# Patient Record
Sex: MALE | Race: WHITE | NOT HISPANIC OR LATINO | Employment: UNEMPLOYED | URBAN - NONMETROPOLITAN AREA
[De-identification: names, ages, dates, MRNs, and addresses within clinical notes are randomized per-mention and may not be internally consistent; named-entity substitution may affect disease eponyms.]

---

## 2017-07-08 ENCOUNTER — HOSPITAL ENCOUNTER (EMERGENCY)
Facility: HOSPITAL | Age: 27
Discharge: HOME | End: 2017-07-08
Attending: EMERGENCY MEDICINE | Admitting: EMERGENCY MEDICINE

## 2017-07-08 VITALS
HEART RATE: 76 BPM | OXYGEN SATURATION: 96 % | RESPIRATION RATE: 18 BRPM | DIASTOLIC BLOOD PRESSURE: 79 MMHG | WEIGHT: 210 LBS | SYSTOLIC BLOOD PRESSURE: 124 MMHG | BODY MASS INDEX: 33.75 KG/M2 | HEIGHT: 66 IN | TEMPERATURE: 98.2 F

## 2017-07-08 DIAGNOSIS — T63.001A SNAKE BITE, ACCIDENTAL OR UNINTENTIONAL, INITIAL ENCOUNTER: Primary | ICD-10-CM

## 2017-07-08 PROCEDURE — 25010000002 TDAP 5-2.5-18.5 LF-MCG/0.5 SUSPENSION: Performed by: NURSE PRACTITIONER

## 2017-07-08 PROCEDURE — 90471 IMMUNIZATION ADMIN: CPT | Performed by: NURSE PRACTITIONER

## 2017-07-08 PROCEDURE — 90715 TDAP VACCINE 7 YRS/> IM: CPT | Performed by: NURSE PRACTITIONER

## 2017-07-08 PROCEDURE — 99283 EMERGENCY DEPT VISIT LOW MDM: CPT

## 2017-07-08 RX ORDER — IBUPROFEN 400 MG/1
400 TABLET ORAL ONCE
Status: COMPLETED | OUTPATIENT
Start: 2017-07-08 | End: 2017-07-08

## 2017-07-08 RX ORDER — ACETAMINOPHEN 325 MG/1
1000 TABLET ORAL ONCE
Status: COMPLETED | OUTPATIENT
Start: 2017-07-08 | End: 2017-07-08

## 2017-07-08 RX ADMIN — IBUPROFEN 400 MG: 400 TABLET ORAL at 22:11

## 2017-07-08 RX ADMIN — ACETAMINOPHEN 975 MG: 325 TABLET ORAL at 22:09

## 2017-07-08 RX ADMIN — TETANUS TOXOID, REDUCED DIPHTHERIA TOXOID AND ACELLULAR PERTUSSIS VACCINE, ADSORBED 0.5 ML: 5; 2.5; 8; 8; 2.5 SUSPENSION INTRAMUSCULAR at 22:12

## 2017-07-09 NOTE — ED NOTES
[place] by what he thinks was a copperhead snake on the inner aspect of big toe on right foot. Two parallel puncture type wounds noted at this time with redness. No swelling, drainage or warmth to the site noted at this time. Pt denies any numbness or tingling at site. Pt able to move toes freely.      Priyanka Woodson RN  07/08/17 1929

## 2017-07-09 NOTE — ED PROVIDER NOTES
Subjective   Patient is a 27 y.o. male presenting with animal bite.   History provided by:  Patient   used: No    Animal Bite   Contact animal:  Snake  Location:  Toe  Toe injury location:  L great toe  Time since incident:  2 hours  Pain details:     Quality:  Sore    Severity:  Mild    Progression:  Unchanged  Incident location:  Outside  Provoked: unprovoked    Notifications:  None  Animal in possession: no    Tetanus status:  Out of date  Relieved by:  Nothing  Worsened by:  Nothing  Ineffective treatments:  None tried  Associated symptoms: no fever, no rash and no swelling        Review of Systems   Constitutional: Negative.  Negative for fever.   HENT: Negative.    Eyes: Negative.    Respiratory: Negative.    Cardiovascular: Negative.    Gastrointestinal: Negative.    Endocrine: Negative.    Genitourinary: Negative.    Musculoskeletal: Negative.    Skin: Positive for wound. Negative for rash.   Allergic/Immunologic: Negative.    Neurological: Negative.    Hematological: Negative.    Psychiatric/Behavioral: Negative.    All other systems reviewed and are negative.      History reviewed. No pertinent past medical history.    Allergies   Allergen Reactions   • Aspirin        History reviewed. No pertinent surgical history.    History reviewed. No pertinent family history.    Social History     Social History   • Marital status: Single     Spouse name: N/A   • Number of children: N/A   • Years of education: N/A     Social History Main Topics   • Smoking status: Current Every Day Smoker     Packs/day: 0.50   • Smokeless tobacco: None   • Alcohol use No   • Drug use: Defer   • Sexual activity: Defer     Other Topics Concern   • None     Social History Narrative           Objective   Physical Exam   Constitutional: He is oriented to person, place, and time. He appears well-developed and well-nourished.   HENT:   Head: Normocephalic and atraumatic.   Nose: Nose normal.   Mouth/Throat: Oropharynx is  clear and moist.   Eyes: EOM are normal. Pupils are equal, round, and reactive to light.   Neck: Normal range of motion. Neck supple.   Cardiovascular: Normal rate, regular rhythm, normal heart sounds and intact distal pulses.    Pulmonary/Chest: Effort normal and breath sounds normal.   Abdominal: Soft. Bowel sounds are normal.   Musculoskeletal: Normal range of motion.   Neurological: He is alert and oriented to person, place, and time.   Skin:   Tiny pinprick abrasions to left inner toe <0.5 cm apart. No surrounding erythema or ecchymosis.    Psychiatric: He has a normal mood and affect. His behavior is normal. Judgment and thought content normal.   Nursing note and vitals reviewed.      Procedures         ED Course  ED Course   Comment By Time   I reevaluated patient, denies any symptoms. No edema or ecchymosis of left great toe SHERWIN Washington 07/08 8398                  MDM  Number of Diagnoses or Management Options  Snake bite, accidental or unintentional, initial encounter: new and does not require workup  Risk of Complications, Morbidity, and/or Mortality  Presenting problems: moderate  Diagnostic procedures: moderate  Management options: moderate    Patient Progress  Patient progress: improved      Final diagnoses:   Snake bite, accidental or unintentional, initial encounter            SHERWIN Washington  07/08/17 4820

## 2017-07-09 NOTE — ED NOTES
No adverse reaction noted at injection site at this time       Priyanka Woodson RN  07/08/17 2036

## 2018-04-11 ENCOUNTER — HOSPITAL ENCOUNTER (EMERGENCY)
Facility: HOSPITAL | Age: 28
Discharge: HOME | End: 2018-04-11
Attending: EMERGENCY MEDICINE | Admitting: EMERGENCY MEDICINE

## 2018-04-11 ENCOUNTER — APPOINTMENT (OUTPATIENT)
Dept: GENERAL RADIOLOGY | Facility: HOSPITAL | Age: 28
End: 2018-04-11

## 2018-04-11 VITALS
SYSTOLIC BLOOD PRESSURE: 102 MMHG | WEIGHT: 215 LBS | RESPIRATION RATE: 18 BRPM | TEMPERATURE: 98.2 F | HEIGHT: 66 IN | OXYGEN SATURATION: 98 % | HEART RATE: 57 BPM | BODY MASS INDEX: 34.55 KG/M2 | DIASTOLIC BLOOD PRESSURE: 67 MMHG

## 2018-04-11 DIAGNOSIS — R07.9 CHEST PAIN, UNSPECIFIED TYPE: Primary | ICD-10-CM

## 2018-04-11 LAB
6-ACETYL MORPHINE: NEGATIVE
ALBUMIN SERPL-MCNC: 4.3 G/DL (ref 3.5–5)
ALBUMIN/GLOB SERPL: 1.5 G/DL (ref 1.5–2.5)
ALP SERPL-CCNC: 75 U/L (ref 40–129)
ALT SERPL W P-5'-P-CCNC: 38 U/L (ref 10–44)
AMPHET+METHAMPHET UR QL: NEGATIVE
ANION GAP SERPL CALCULATED.3IONS-SCNC: 5.5 MMOL/L (ref 3.6–11.2)
AST SERPL-CCNC: 23 U/L (ref 10–34)
BACTERIA UR QL AUTO: NORMAL /HPF
BARBITURATES UR QL SCN: NEGATIVE
BASOPHILS # BLD AUTO: 0.02 10*3/MM3 (ref 0–0.3)
BASOPHILS NFR BLD AUTO: 0.3 % (ref 0–2)
BENZODIAZ UR QL SCN: NEGATIVE
BILIRUB SERPL-MCNC: 0.6 MG/DL (ref 0.2–1.8)
BILIRUB UR QL STRIP: NEGATIVE
BUN BLD-MCNC: 12 MG/DL (ref 7–21)
BUN/CREAT SERPL: 15 (ref 7–25)
BUPRENORPHINE SERPL-MCNC: NEGATIVE NG/ML
CALCIUM SPEC-SCNC: 9.4 MG/DL (ref 7.7–10)
CANNABINOIDS SERPL QL: NEGATIVE
CHLORIDE SERPL-SCNC: 107 MMOL/L (ref 99–112)
CLARITY UR: CLEAR
CO2 SERPL-SCNC: 27.5 MMOL/L (ref 24.3–31.9)
COCAINE UR QL: NEGATIVE
COLOR UR: YELLOW
CREAT BLD-MCNC: 0.8 MG/DL (ref 0.43–1.29)
D DIMER PPP FEU-MCNC: <0.27 MCGFEU/ML (ref 0–0.5)
DEPRECATED RDW RBC AUTO: 38.1 FL (ref 37–54)
EOSINOPHIL # BLD AUTO: 0.1 10*3/MM3 (ref 0–0.7)
EOSINOPHIL NFR BLD AUTO: 1.3 % (ref 0–5)
ERYTHROCYTE [DISTWIDTH] IN BLOOD BY AUTOMATED COUNT: 12.6 % (ref 11.5–14.5)
GFR SERPL CREATININE-BSD FRML MDRD: 114 ML/MIN/1.73
GLOBULIN UR ELPH-MCNC: 2.8 GM/DL
GLUCOSE BLD-MCNC: 88 MG/DL (ref 70–110)
GLUCOSE UR STRIP-MCNC: NEGATIVE MG/DL
HCT VFR BLD AUTO: 44.9 % (ref 42–52)
HGB BLD-MCNC: 15 G/DL (ref 14–18)
HGB UR QL STRIP.AUTO: NEGATIVE
HYALINE CASTS UR QL AUTO: NORMAL /LPF
IMM GRANULOCYTES # BLD: 0 10*3/MM3 (ref 0–0.03)
IMM GRANULOCYTES NFR BLD: 0 % (ref 0–0.5)
KETONES UR QL STRIP: ABNORMAL
LEUKOCYTE ESTERASE UR QL STRIP.AUTO: ABNORMAL
LYMPHOCYTES # BLD AUTO: 1.88 10*3/MM3 (ref 1–3)
LYMPHOCYTES NFR BLD AUTO: 25.3 % (ref 21–51)
MCH RBC QN AUTO: 27.8 PG (ref 27–33)
MCHC RBC AUTO-ENTMCNC: 33.4 G/DL (ref 33–37)
MCV RBC AUTO: 83.3 FL (ref 80–94)
METHADONE UR QL SCN: NEGATIVE
MONOCYTES # BLD AUTO: 0.57 10*3/MM3 (ref 0.1–0.9)
MONOCYTES NFR BLD AUTO: 7.7 % (ref 0–10)
NEUTROPHILS # BLD AUTO: 4.87 10*3/MM3 (ref 1.4–6.5)
NEUTROPHILS NFR BLD AUTO: 65.4 % (ref 30–70)
NITRITE UR QL STRIP: NEGATIVE
OPIATES UR QL: NEGATIVE
OSMOLALITY SERPL CALC.SUM OF ELEC: 278.6 MOSM/KG (ref 273–305)
OXYCODONE UR QL SCN: NEGATIVE
PCP UR QL SCN: NEGATIVE
PH UR STRIP.AUTO: 7.5 [PH] (ref 5–8)
PLATELET # BLD AUTO: 197 10*3/MM3 (ref 130–400)
PMV BLD AUTO: 11.1 FL (ref 6–10)
POTASSIUM BLD-SCNC: 3.9 MMOL/L (ref 3.5–5.3)
PROT SERPL-MCNC: 7.1 G/DL (ref 6–8)
PROT UR QL STRIP: NEGATIVE
RBC # BLD AUTO: 5.39 10*6/MM3 (ref 4.7–6.1)
RBC # UR: NORMAL /HPF
REF LAB TEST METHOD: NORMAL
SODIUM BLD-SCNC: 140 MMOL/L (ref 135–153)
SP GR UR STRIP: 1.02 (ref 1–1.03)
SQUAMOUS #/AREA URNS HPF: NORMAL /HPF
TROPONIN I SERPL-MCNC: <0.006 NG/ML
TROPONIN I SERPL-MCNC: <0.006 NG/ML
UROBILINOGEN UR QL STRIP: ABNORMAL
WBC NRBC COR # BLD: 7.44 10*3/MM3 (ref 4.5–12.5)
WBC UR QL AUTO: NORMAL /HPF

## 2018-04-11 PROCEDURE — 80307 DRUG TEST PRSMV CHEM ANLYZR: CPT | Performed by: PHYSICIAN ASSISTANT

## 2018-04-11 PROCEDURE — 93005 ELECTROCARDIOGRAM TRACING: CPT | Performed by: EMERGENCY MEDICINE

## 2018-04-11 PROCEDURE — 71046 X-RAY EXAM CHEST 2 VIEWS: CPT | Performed by: RADIOLOGY

## 2018-04-11 PROCEDURE — 81001 URINALYSIS AUTO W/SCOPE: CPT | Performed by: PHYSICIAN ASSISTANT

## 2018-04-11 PROCEDURE — 80053 COMPREHEN METABOLIC PANEL: CPT | Performed by: PHYSICIAN ASSISTANT

## 2018-04-11 PROCEDURE — 85025 COMPLETE CBC W/AUTO DIFF WBC: CPT | Performed by: PHYSICIAN ASSISTANT

## 2018-04-11 PROCEDURE — 96374 THER/PROPH/DIAG INJ IV PUSH: CPT

## 2018-04-11 PROCEDURE — 85379 FIBRIN DEGRADATION QUANT: CPT | Performed by: PHYSICIAN ASSISTANT

## 2018-04-11 PROCEDURE — 71046 X-RAY EXAM CHEST 2 VIEWS: CPT

## 2018-04-11 PROCEDURE — 96375 TX/PRO/DX INJ NEW DRUG ADDON: CPT

## 2018-04-11 PROCEDURE — 25010000002 ONDANSETRON PER 1 MG: Performed by: PHYSICIAN ASSISTANT

## 2018-04-11 PROCEDURE — 25010000002 MORPHINE PER 10 MG: Performed by: EMERGENCY MEDICINE

## 2018-04-11 PROCEDURE — 99284 EMERGENCY DEPT VISIT MOD MDM: CPT

## 2018-04-11 PROCEDURE — 93010 ELECTROCARDIOGRAM REPORT: CPT | Performed by: INTERNAL MEDICINE

## 2018-04-11 PROCEDURE — 84484 ASSAY OF TROPONIN QUANT: CPT | Performed by: PHYSICIAN ASSISTANT

## 2018-04-11 RX ORDER — PREDNISONE 20 MG/1
20 TABLET ORAL DAILY
Qty: 5 TABLET | Refills: 0 | Status: ON HOLD | OUTPATIENT
Start: 2018-04-11 | End: 2020-07-21

## 2018-04-11 RX ORDER — ONDANSETRON 2 MG/ML
4 INJECTION INTRAMUSCULAR; INTRAVENOUS ONCE
Status: COMPLETED | OUTPATIENT
Start: 2018-04-11 | End: 2018-04-11

## 2018-04-11 RX ORDER — MORPHINE SULFATE 2 MG/ML
2 INJECTION, SOLUTION INTRAMUSCULAR; INTRAVENOUS ONCE
Status: COMPLETED | OUTPATIENT
Start: 2018-04-11 | End: 2018-04-11

## 2018-04-11 RX ORDER — SODIUM CHLORIDE 0.9 % (FLUSH) 0.9 %
10 SYRINGE (ML) INJECTION AS NEEDED
Status: DISCONTINUED | OUTPATIENT
Start: 2018-04-11 | End: 2018-04-11 | Stop reason: HOSPADM

## 2018-04-11 RX ADMIN — ONDANSETRON 4 MG: 2 INJECTION INTRAMUSCULAR; INTRAVENOUS at 12:55

## 2018-04-11 RX ADMIN — MORPHINE SULFATE 2 MG: 2 INJECTION, SOLUTION INTRAMUSCULAR; INTRAVENOUS at 12:54

## 2018-04-11 RX ADMIN — NITROGLYCERIN 0.5 INCH: 20 OINTMENT TOPICAL at 12:51

## 2018-04-11 NOTE — DISCHARGE INSTRUCTIONS
Call one of the offices below to establish a primary care provider.  If you are unable to get an appointment and feel it is an emergency and need to be seen immediately please return to the Emergency Department.    Call one of the office below to set up a primary care provider.    Dr. Fidel Maurer                                                                                                       [address] [facility] [address]  [phone]    Dr. Linton, Dr. SONY Snow, Dr. ETIENNE Snow ([facility])  [address] [facility] [address]  [phone]    Dr. Flores, Dr. Vail, Dr. Long ([facility])  [address] [facility] [address]  [phone]    Dr. Crews  [address] [facility] [address]  [phone]    Dr. Streeter, Dr. Proctor, Dr. Chicas, Dr. Ramon ([facility])  [address] [place] [address] [address]  [facility] [address]  [phone]    Dr. Qian Freedman  [address] [facility] [place] [address]  [phone]    Dr. Cary Lowe  [address]   [address]  [facility] [address]  [phone]    Dr. Maurer  [address] [facility] [address]  [phone]    Dr. Nava, Dr. Iyer  [address] [facility] [place] [address]  [phone]    Dr. Hunt  [address]                                                              [address]  [facility] [address]  [phone]    Dr. Baker  [address] [facility] [address] [phone]    Dr. Greer Null  [address]  [address]  [facility] [address]  [phone]

## 2019-05-20 ENCOUNTER — HOSPITAL ENCOUNTER (OUTPATIENT)
Dept: PHYSICAL THERAPY | Facility: HOSPITAL | Age: 29
Setting detail: THERAPIES SERIES
Discharge: HOME | End: 2019-05-20

## 2019-05-20 PROCEDURE — 97750 PHYSICAL PERFORMANCE TEST: CPT

## 2019-07-03 ENCOUNTER — APPOINTMENT (OUTPATIENT)
Dept: GENERAL RADIOLOGY | Facility: HOSPITAL | Age: 29
End: 2019-07-03

## 2019-07-03 ENCOUNTER — HOSPITAL ENCOUNTER (EMERGENCY)
Facility: HOSPITAL | Age: 29
Discharge: HOME | End: 2019-07-03
Attending: FAMILY MEDICINE | Admitting: FAMILY MEDICINE

## 2019-07-03 VITALS
BODY MASS INDEX: 36.96 KG/M2 | OXYGEN SATURATION: 95 % | SYSTOLIC BLOOD PRESSURE: 107 MMHG | WEIGHT: 230 LBS | HEART RATE: 77 BPM | RESPIRATION RATE: 18 BRPM | DIASTOLIC BLOOD PRESSURE: 69 MMHG | HEIGHT: 66 IN | TEMPERATURE: 98.5 F

## 2019-07-03 DIAGNOSIS — R07.9 CHEST PAIN WITH LOW RISK FOR CARDIAC ETIOLOGY: Primary | ICD-10-CM

## 2019-07-03 LAB
6-ACETYL MORPHINE: NEGATIVE
ALBUMIN SERPL-MCNC: 4.13 G/DL (ref 3.5–5.2)
ALBUMIN/GLOB SERPL: 1.3 G/DL
ALP SERPL-CCNC: 79 U/L (ref 39–117)
ALT SERPL W P-5'-P-CCNC: 55 U/L (ref 1–41)
AMPHET+METHAMPHET UR QL: NEGATIVE
ANION GAP SERPL CALCULATED.3IONS-SCNC: 9.4 MMOL/L (ref 5–15)
APTT PPP: 28.2 SECONDS (ref 23.8–36.1)
AST SERPL-CCNC: 29 U/L (ref 1–40)
BARBITURATES UR QL SCN: NEGATIVE
BASOPHILS # BLD AUTO: 0.01 10*3/MM3 (ref 0–0.2)
BASOPHILS NFR BLD AUTO: 0.2 % (ref 0–1.5)
BENZODIAZ UR QL SCN: NEGATIVE
BILIRUB SERPL-MCNC: 0.3 MG/DL (ref 0.2–1.2)
BILIRUB UR QL STRIP: NEGATIVE
BUN BLD-MCNC: 11 MG/DL (ref 6–20)
BUN/CREAT SERPL: 14.1 (ref 7–25)
BUPRENORPHINE SERPL-MCNC: NEGATIVE NG/ML
CALCIUM SPEC-SCNC: 9.2 MG/DL (ref 8.6–10.5)
CANNABINOIDS SERPL QL: NEGATIVE
CHLORIDE SERPL-SCNC: 106 MMOL/L (ref 98–107)
CLARITY UR: CLEAR
CO2 SERPL-SCNC: 22.6 MMOL/L (ref 22–29)
COCAINE UR QL: NEGATIVE
COLOR UR: YELLOW
CREAT BLD-MCNC: 0.78 MG/DL (ref 0.76–1.27)
DEPRECATED RDW RBC AUTO: 38.5 FL (ref 37–54)
EOSINOPHIL # BLD AUTO: 0.15 10*3/MM3 (ref 0–0.4)
EOSINOPHIL NFR BLD AUTO: 2.3 % (ref 0.3–6.2)
ERYTHROCYTE [DISTWIDTH] IN BLOOD BY AUTOMATED COUNT: 12.8 % (ref 12.3–15.4)
GFR SERPL CREATININE-BSD FRML MDRD: 117 ML/MIN/1.73
GLOBULIN UR ELPH-MCNC: 3.1 GM/DL
GLUCOSE BLD-MCNC: 110 MG/DL (ref 65–99)
GLUCOSE UR STRIP-MCNC: NEGATIVE MG/DL
HCT VFR BLD AUTO: 43.9 % (ref 37.5–51)
HGB BLD-MCNC: 14.9 G/DL (ref 13–17.7)
HGB UR QL STRIP.AUTO: NEGATIVE
IMM GRANULOCYTES # BLD AUTO: 0.03 10*3/MM3 (ref 0–0.05)
IMM GRANULOCYTES NFR BLD AUTO: 0.5 % (ref 0–0.5)
INR PPP: 0.9 (ref 0.9–1.1)
KETONES UR QL STRIP: NEGATIVE
LEUKOCYTE ESTERASE UR QL STRIP.AUTO: NEGATIVE
LIPASE SERPL-CCNC: 21 U/L (ref 13–60)
LYMPHOCYTES # BLD AUTO: 1.58 10*3/MM3 (ref 0.7–3.1)
LYMPHOCYTES NFR BLD AUTO: 24.3 % (ref 19.6–45.3)
MCH RBC QN AUTO: 28.1 PG (ref 26.6–33)
MCHC RBC AUTO-ENTMCNC: 33.9 G/DL (ref 31.5–35.7)
MCV RBC AUTO: 82.7 FL (ref 79–97)
METHADONE UR QL SCN: NEGATIVE
MONOCYTES # BLD AUTO: 0.61 10*3/MM3 (ref 0.1–0.9)
MONOCYTES NFR BLD AUTO: 9.4 % (ref 5–12)
NEUTROPHILS # BLD AUTO: 4.12 10*3/MM3 (ref 1.7–7)
NEUTROPHILS NFR BLD AUTO: 63.3 % (ref 42.7–76)
NITRITE UR QL STRIP: NEGATIVE
OPIATES UR QL: NEGATIVE
OXYCODONE UR QL SCN: NEGATIVE
PCP UR QL SCN: NEGATIVE
PH UR STRIP.AUTO: 8 [PH] (ref 5–8)
PLATELET # BLD AUTO: 193 10*3/MM3 (ref 140–450)
PMV BLD AUTO: 10.5 FL (ref 6–12)
POTASSIUM BLD-SCNC: 4.1 MMOL/L (ref 3.5–5.2)
PROT SERPL-MCNC: 7.2 G/DL (ref 6–8.5)
PROT UR QL STRIP: NEGATIVE
PROTHROMBIN TIME: 12.6 SECONDS (ref 11–15.4)
RBC # BLD AUTO: 5.31 10*6/MM3 (ref 4.14–5.8)
SODIUM BLD-SCNC: 138 MMOL/L (ref 136–145)
SP GR UR STRIP: 1.02 (ref 1–1.03)
TROPONIN T SERPL-MCNC: <0.01 NG/ML (ref 0–0.03)
TROPONIN T SERPL-MCNC: <0.01 NG/ML (ref 0–0.03)
UROBILINOGEN UR QL STRIP: NORMAL
WBC NRBC COR # BLD: 6.5 10*3/MM3 (ref 3.4–10.8)

## 2019-07-03 PROCEDURE — 25010000002 MORPHINE PER 10 MG: Performed by: FAMILY MEDICINE

## 2019-07-03 PROCEDURE — 80307 DRUG TEST PRSMV CHEM ANLYZR: CPT | Performed by: FAMILY MEDICINE

## 2019-07-03 PROCEDURE — 96375 TX/PRO/DX INJ NEW DRUG ADDON: CPT

## 2019-07-03 PROCEDURE — 93005 ELECTROCARDIOGRAM TRACING: CPT | Performed by: FAMILY MEDICINE

## 2019-07-03 PROCEDURE — 85610 PROTHROMBIN TIME: CPT | Performed by: FAMILY MEDICINE

## 2019-07-03 PROCEDURE — 85730 THROMBOPLASTIN TIME PARTIAL: CPT | Performed by: FAMILY MEDICINE

## 2019-07-03 PROCEDURE — 96361 HYDRATE IV INFUSION ADD-ON: CPT

## 2019-07-03 PROCEDURE — 71045 X-RAY EXAM CHEST 1 VIEW: CPT | Performed by: RADIOLOGY

## 2019-07-03 PROCEDURE — 25010000002 ONDANSETRON PER 1 MG: Performed by: FAMILY MEDICINE

## 2019-07-03 PROCEDURE — 80053 COMPREHEN METABOLIC PANEL: CPT | Performed by: FAMILY MEDICINE

## 2019-07-03 PROCEDURE — 71045 X-RAY EXAM CHEST 1 VIEW: CPT

## 2019-07-03 PROCEDURE — 96374 THER/PROPH/DIAG INJ IV PUSH: CPT

## 2019-07-03 PROCEDURE — 99285 EMERGENCY DEPT VISIT HI MDM: CPT

## 2019-07-03 PROCEDURE — 84484 ASSAY OF TROPONIN QUANT: CPT | Performed by: FAMILY MEDICINE

## 2019-07-03 PROCEDURE — 83690 ASSAY OF LIPASE: CPT | Performed by: FAMILY MEDICINE

## 2019-07-03 PROCEDURE — 81003 URINALYSIS AUTO W/O SCOPE: CPT | Performed by: FAMILY MEDICINE

## 2019-07-03 PROCEDURE — 85025 COMPLETE CBC W/AUTO DIFF WBC: CPT | Performed by: FAMILY MEDICINE

## 2019-07-03 RX ORDER — SODIUM CHLORIDE 9 MG/ML
125 INJECTION, SOLUTION INTRAVENOUS CONTINUOUS
Status: DISCONTINUED | OUTPATIENT
Start: 2019-07-03 | End: 2019-07-03 | Stop reason: HOSPADM

## 2019-07-03 RX ORDER — ALUMINA, MAGNESIA, AND SIMETHICONE 2400; 2400; 240 MG/30ML; MG/30ML; MG/30ML
15 SUSPENSION ORAL ONCE
Status: COMPLETED | OUTPATIENT
Start: 2019-07-03 | End: 2019-07-03

## 2019-07-03 RX ORDER — SODIUM CHLORIDE 0.9 % (FLUSH) 0.9 %
10 SYRINGE (ML) INJECTION AS NEEDED
Status: DISCONTINUED | OUTPATIENT
Start: 2019-07-03 | End: 2019-07-03 | Stop reason: HOSPADM

## 2019-07-03 RX ORDER — MORPHINE SULFATE 2 MG/ML
2 INJECTION, SOLUTION INTRAMUSCULAR; INTRAVENOUS ONCE
Status: COMPLETED | OUTPATIENT
Start: 2019-07-03 | End: 2019-07-03

## 2019-07-03 RX ORDER — PANTOPRAZOLE SODIUM 20 MG/1
20 TABLET, DELAYED RELEASE ORAL 2 TIMES DAILY
Qty: 40 TABLET | Refills: 0 | Status: ON HOLD | OUTPATIENT
Start: 2019-07-03 | End: 2020-07-21

## 2019-07-03 RX ORDER — ONDANSETRON 2 MG/ML
4 INJECTION INTRAMUSCULAR; INTRAVENOUS ONCE
Status: COMPLETED | OUTPATIENT
Start: 2019-07-03 | End: 2019-07-03

## 2019-07-03 RX ORDER — SUCRALFATE 1 G/1
1 TABLET ORAL 4 TIMES DAILY
Qty: 20 TABLET | Refills: 0 | Status: ON HOLD | OUTPATIENT
Start: 2019-07-03 | End: 2020-07-21

## 2019-07-03 RX ADMIN — MORPHINE SULFATE 2 MG: 2 INJECTION, SOLUTION INTRAMUSCULAR; INTRAVENOUS at 09:35

## 2019-07-03 RX ADMIN — LIDOCAINE HYDROCHLORIDE 15 ML: 20 SOLUTION ORAL; TOPICAL at 12:38

## 2019-07-03 RX ADMIN — ONDANSETRON 4 MG: 2 INJECTION, SOLUTION INTRAMUSCULAR; INTRAVENOUS at 09:39

## 2019-07-03 RX ADMIN — ALUMINUM HYDROXIDE, MAGNESIUM HYDROXIDE, AND DIMETHICONE 15 ML: 400; 400; 40 SUSPENSION ORAL at 12:38

## 2019-07-03 RX ADMIN — NITROGLYCERIN 1 INCH: 20 OINTMENT TOPICAL at 09:36

## 2019-07-03 RX ADMIN — SODIUM CHLORIDE 125 ML/HR: 9 INJECTION, SOLUTION INTRAVENOUS at 09:34

## 2019-07-03 NOTE — ED PROVIDER NOTES
Subjective   29 yo male ports the emergency department complaining of chest pain for the last 2 days initially was intermittent now it is becoming more constant.  Patient has no medical problems that he takes medications for.  Patient reports that his dad had a heart attack at 40 so he is very concerned.  Denies any shortness of breath he says he just is having more frequent        History provided by:  Patient  Chest Pain   Pain location:  L chest  Pain quality: dull    Pain radiates to:  Does not radiate  Pain severity:  Mild  Onset quality:  Gradual  Duration:  3 days  Timing:  Intermittent  Progression:  Waxing and waning  Chronicity:  New  Context: at rest    Relieved by:  Nothing  Worsened by:  Nothing  Ineffective treatments:  None tried  Associated symptoms: no abdominal pain and no fever    Risk factors: male sex        Review of Systems   Constitutional: Negative.  Negative for fever.   HENT: Negative.    Respiratory: Negative.    Cardiovascular: Positive for chest pain.   Gastrointestinal: Negative.  Negative for abdominal pain.   Endocrine: Negative.    Genitourinary: Negative.  Negative for dysuria.   Skin: Negative.    Neurological: Negative.    Psychiatric/Behavioral: Negative.    All other systems reviewed and are negative.      Past Medical History:   Diagnosis Date   • Anxiety        Allergies   Allergen Reactions   • Aspirin Swelling       History reviewed. No pertinent surgical history.    History reviewed. No pertinent family history.    Social History     Socioeconomic History   • Marital status: Single     Spouse name: Not on file   • Number of children: Not on file   • Years of education: Not on file   • Highest education level: Not on file   Tobacco Use   • Smoking status: Current Every Day Smoker     Packs/day: 0.50   Substance and Sexual Activity   • Alcohol use: No   • Drug use: Defer   • Sexual activity: Defer           Objective   Physical Exam   Constitutional: He is oriented to  person, place, and time. He appears well-developed and well-nourished.   HENT:   Head: Normocephalic and atraumatic.   Right Ear: External ear normal.   Left Ear: External ear normal.   Mouth/Throat: Oropharynx is clear and moist.   Eyes: EOM are normal. Pupils are equal, round, and reactive to light.   Neck: Neck supple.   Cardiovascular: Normal rate and regular rhythm.   Pulmonary/Chest: Effort normal.   Abdominal: Soft. Bowel sounds are normal.   Musculoskeletal: Normal range of motion.   Neurological: He is alert and oriented to person, place, and time.   Skin: Skin is warm. Capillary refill takes less than 2 seconds.   Psychiatric: He has a normal mood and affect. His behavior is normal. Judgment and thought content normal.   Nursing note and vitals reviewed.      Procedures           ED Course  ED Course as of Jul 04 2107 Wed Jul 03, 2019   1137 EKG interpretation time is 856 normal sinus rhythm 66 bpm QRS duration is 104 QT is 406 QTC is 425 incomplete right bundle branch block is noted no acute ST elevation at this time  [MH]      ED Course User Index  [MH] Ellen Earl DO                  MDM  Number of Diagnoses or Management Options  Chest pain with low risk for cardiac etiology: new and requires workup     Amount and/or Complexity of Data Reviewed  Clinical lab tests: ordered and reviewed  Tests in the radiology section of CPT®: ordered and reviewed  Tests in the medicine section of CPT®: reviewed and ordered  Independent visualization of images, tracings, or specimens: yes    Risk of Complications, Morbidity, and/or Mortality  Presenting problems: high  Diagnostic procedures: moderate  Management options: moderate    Patient Progress  Patient progress: stable        Final diagnoses:   Chest pain with low risk for cardiac etiology            Ellen Earl DO  07/04/19 2108

## 2019-07-03 NOTE — ED NOTES
Pt states that his chest pain is still a 4 to 5 on scale of 0-10 after GI cocktail     Israel Zambrano, RN  07/03/19 4990

## 2019-07-03 NOTE — ED NOTES
Gave patient a urinal for urine collection. Patient is trying to use the restroom at this time.      Joy Novak  07/03/19 0919

## 2019-07-03 NOTE — ED NOTES
Gave patient the call light. Encouraged him to use it for whatever he needs.      Joy Novak  07/03/19 0999

## 2019-07-05 ENCOUNTER — TRANSCRIBE ORDERS (OUTPATIENT)
Dept: ADMINISTRATIVE | Facility: HOSPITAL | Age: 29
End: 2019-07-05

## 2019-07-05 DIAGNOSIS — R07.9 CHEST PAIN, UNSPECIFIED TYPE: Primary | ICD-10-CM

## 2020-07-18 ENCOUNTER — APPOINTMENT (OUTPATIENT)
Dept: GENERAL RADIOLOGY | Facility: HOSPITAL | Age: 30
End: 2020-07-18

## 2020-07-18 ENCOUNTER — HOSPITAL ENCOUNTER (EMERGENCY)
Facility: HOSPITAL | Age: 30
Discharge: HOME | End: 2020-07-18
Attending: EMERGENCY MEDICINE | Admitting: EMERGENCY MEDICINE

## 2020-07-18 VITALS
RESPIRATION RATE: 20 BRPM | WEIGHT: 260 LBS | DIASTOLIC BLOOD PRESSURE: 77 MMHG | HEIGHT: 66 IN | SYSTOLIC BLOOD PRESSURE: 116 MMHG | OXYGEN SATURATION: 96 % | BODY MASS INDEX: 41.78 KG/M2 | HEART RATE: 57 BPM | TEMPERATURE: 98.3 F

## 2020-07-18 DIAGNOSIS — R07.89 ATYPICAL CHEST PAIN: Primary | ICD-10-CM

## 2020-07-18 LAB
ALBUMIN SERPL-MCNC: 4.24 G/DL (ref 3.5–5.2)
ALBUMIN/GLOB SERPL: 1.5 G/DL
ALP SERPL-CCNC: 84 U/L (ref 39–117)
ALT SERPL W P-5'-P-CCNC: 59 U/L (ref 1–41)
ANION GAP SERPL CALCULATED.3IONS-SCNC: 13.3 MMOL/L (ref 5–15)
AST SERPL-CCNC: 31 U/L (ref 1–40)
BASOPHILS # BLD AUTO: 0.04 10*3/MM3 (ref 0–0.2)
BASOPHILS NFR BLD AUTO: 0.4 % (ref 0–1.5)
BILIRUB SERPL-MCNC: 0.3 MG/DL (ref 0–1.2)
BILIRUB UR QL STRIP: NEGATIVE
BUN SERPL-MCNC: 10 MG/DL (ref 6–20)
BUN/CREAT SERPL: 13.5 (ref 7–25)
CALCIUM SPEC-SCNC: 9.5 MG/DL (ref 8.6–10.5)
CHLORIDE SERPL-SCNC: 103 MMOL/L (ref 98–107)
CLARITY UR: CLEAR
CO2 SERPL-SCNC: 24.7 MMOL/L (ref 22–29)
COLOR UR: YELLOW
CREAT SERPL-MCNC: 0.74 MG/DL (ref 0.76–1.27)
DEPRECATED RDW RBC AUTO: 38.8 FL (ref 37–54)
EOSINOPHIL # BLD AUTO: 0.17 10*3/MM3 (ref 0–0.4)
EOSINOPHIL NFR BLD AUTO: 1.6 % (ref 0.3–6.2)
ERYTHROCYTE [DISTWIDTH] IN BLOOD BY AUTOMATED COUNT: 12.7 % (ref 12.3–15.4)
GFR SERPL CREATININE-BSD FRML MDRD: 123 ML/MIN/1.73
GLOBULIN UR ELPH-MCNC: 2.9 GM/DL
GLUCOSE SERPL-MCNC: 111 MG/DL (ref 65–99)
GLUCOSE UR STRIP-MCNC: NEGATIVE MG/DL
HCT VFR BLD AUTO: 45.5 % (ref 37.5–51)
HGB BLD-MCNC: 14.6 G/DL (ref 13–17.7)
HGB UR QL STRIP.AUTO: NEGATIVE
IMM GRANULOCYTES # BLD AUTO: 0.08 10*3/MM3 (ref 0–0.05)
IMM GRANULOCYTES NFR BLD AUTO: 0.8 % (ref 0–0.5)
KETONES UR QL STRIP: ABNORMAL
LEUKOCYTE ESTERASE UR QL STRIP.AUTO: NEGATIVE
LYMPHOCYTES # BLD AUTO: 2.42 10*3/MM3 (ref 0.7–3.1)
LYMPHOCYTES NFR BLD AUTO: 23 % (ref 19.6–45.3)
MCH RBC QN AUTO: 27.1 PG (ref 26.6–33)
MCHC RBC AUTO-ENTMCNC: 32.1 G/DL (ref 31.5–35.7)
MCV RBC AUTO: 84.6 FL (ref 79–97)
MONOCYTES # BLD AUTO: 0.71 10*3/MM3 (ref 0.1–0.9)
MONOCYTES NFR BLD AUTO: 6.8 % (ref 5–12)
NEUTROPHILS NFR BLD AUTO: 67.4 % (ref 42.7–76)
NEUTROPHILS NFR BLD AUTO: 7.09 10*3/MM3 (ref 1.7–7)
NITRITE UR QL STRIP: NEGATIVE
NRBC BLD AUTO-RTO: 0 /100 WBC (ref 0–0.2)
PH UR STRIP.AUTO: 6 [PH] (ref 5–8)
PLATELET # BLD AUTO: 201 10*3/MM3 (ref 140–450)
PMV BLD AUTO: 10.3 FL (ref 6–12)
POTASSIUM SERPL-SCNC: 3.9 MMOL/L (ref 3.5–5.2)
PROT SERPL-MCNC: 7.1 G/DL (ref 6–8.5)
PROT UR QL STRIP: NEGATIVE
RBC # BLD AUTO: 5.38 10*6/MM3 (ref 4.14–5.8)
SODIUM SERPL-SCNC: 141 MMOL/L (ref 136–145)
SP GR UR STRIP: 1.02 (ref 1–1.03)
TROPONIN T SERPL-MCNC: <0.01 NG/ML (ref 0–0.03)
UROBILINOGEN UR QL STRIP: ABNORMAL
WBC # BLD AUTO: 10.51 10*3/MM3 (ref 3.4–10.8)

## 2020-07-18 PROCEDURE — 80053 COMPREHEN METABOLIC PANEL: CPT | Performed by: EMERGENCY MEDICINE

## 2020-07-18 PROCEDURE — 93010 ELECTROCARDIOGRAM REPORT: CPT | Performed by: SPECIALIST

## 2020-07-18 PROCEDURE — 84484 ASSAY OF TROPONIN QUANT: CPT | Performed by: EMERGENCY MEDICINE

## 2020-07-18 PROCEDURE — 81003 URINALYSIS AUTO W/O SCOPE: CPT | Performed by: EMERGENCY MEDICINE

## 2020-07-18 PROCEDURE — 85025 COMPLETE CBC W/AUTO DIFF WBC: CPT | Performed by: EMERGENCY MEDICINE

## 2020-07-18 PROCEDURE — 99283 EMERGENCY DEPT VISIT LOW MDM: CPT

## 2020-07-18 PROCEDURE — 25010000002 FUROSEMIDE PER 20 MG: Performed by: EMERGENCY MEDICINE

## 2020-07-18 PROCEDURE — 71045 X-RAY EXAM CHEST 1 VIEW: CPT

## 2020-07-18 PROCEDURE — 93005 ELECTROCARDIOGRAM TRACING: CPT | Performed by: EMERGENCY MEDICINE

## 2020-07-18 PROCEDURE — 96374 THER/PROPH/DIAG INJ IV PUSH: CPT

## 2020-07-18 RX ORDER — FUROSEMIDE 10 MG/ML
40 INJECTION INTRAMUSCULAR; INTRAVENOUS ONCE
Status: COMPLETED | OUTPATIENT
Start: 2020-07-18 | End: 2020-07-18

## 2020-07-18 RX ADMIN — FUROSEMIDE 40 MG: 10 INJECTION, SOLUTION INTRAMUSCULAR; INTRAVENOUS at 20:24

## 2020-07-19 NOTE — DISCHARGE INSTRUCTIONS
Call one of the offices below to establish a primary care provider.  If you are unable to get an appointment and feel it is an emergency and need to be seen immediately please return to the Emergency Department.    Call one of the office below to set up a primary care provider.    Dr. Fidel Maurer                                                                                                       [address] [facility] [address]  [phone]    Dr. Linton, Dr. SONY Snow, Dr. ETIENNE Snow ([facility])  [address] [facility] [address]  [phone]    Dr. Flores, Dr. Vail, Dr. Long ([facility])  [address] [facility] [address]  [phone]    Dr. Crews  [address] [facility] [address]  [phone]    Dr. Streeter, Dr. Proctor, Dr. Chicas, Dr. Ramon ([facility])  [address] [place] [address] [address]  [facility] [address]  [phone]    Dr. Qian Freedman  [address] [facility] [place] [address]  [phone]    Dr. Cary Lowe  [address]   [address]  [facility] [address]  [phone]    Dr. Maurer  [address] [facility] [address]  [phone]    Dr. Nava, Dr. Iyer  [address] [facility] [place] [address]  [phone]    Dr. Hunt  [address]                                                              [address]  [facility] [address]  [phone]    Dr. Baker  [address] [facility] [address]  [phone]    Dr. Greer Null  [address]  [address]  [place] [address]  [phone]    Dr. Denton and [facility]   [address] [facility]  [address]  [place] [address]  [phone]

## 2020-07-21 ENCOUNTER — HOSPITAL ENCOUNTER (OUTPATIENT)
Facility: HOSPITAL | Age: 30
Discharge: HOME | End: 2020-07-24
Attending: INTERNAL MEDICINE | Admitting: INTERNAL MEDICINE

## 2020-07-21 ENCOUNTER — OFFICE VISIT (OUTPATIENT)
Dept: CARDIOLOGY | Facility: CLINIC | Age: 30
End: 2020-07-21

## 2020-07-21 VITALS
RESPIRATION RATE: 16 BRPM | WEIGHT: 252.2 LBS | TEMPERATURE: 97.4 F | HEIGHT: 66 IN | HEART RATE: 82 BPM | SYSTOLIC BLOOD PRESSURE: 124 MMHG | DIASTOLIC BLOOD PRESSURE: 77 MMHG | BODY MASS INDEX: 40.53 KG/M2

## 2020-07-21 DIAGNOSIS — I20.9 ANGINA, CLASS III (HCC): ICD-10-CM

## 2020-07-21 DIAGNOSIS — R07.2 PRECORDIAL PAIN: Primary | ICD-10-CM

## 2020-07-21 DIAGNOSIS — R55 SYNCOPE AND COLLAPSE: ICD-10-CM

## 2020-07-21 DIAGNOSIS — R94.39 ABNORMAL NUCLEAR STRESS TEST: Primary | ICD-10-CM

## 2020-07-21 DIAGNOSIS — R06.09 DYSPNEA ON EXERTION: ICD-10-CM

## 2020-07-21 PROBLEM — R07.9 CHEST PAIN: Status: ACTIVE | Noted: 2020-07-21

## 2020-07-21 LAB
A-A DO2: 21 MMHG (ref 0–300)
ALBUMIN SERPL-MCNC: 4.09 G/DL (ref 3.5–5.2)
ALBUMIN/GLOB SERPL: 1.3 G/DL
ALP SERPL-CCNC: 83 U/L (ref 39–117)
ALT SERPL W P-5'-P-CCNC: 61 U/L (ref 1–41)
ANION GAP SERPL CALCULATED.3IONS-SCNC: 14.5 MMOL/L (ref 5–15)
ARTERIAL PATENCY WRIST A: POSITIVE
AST SERPL-CCNC: 30 U/L (ref 1–40)
ATMOSPHERIC PRESS: 728 MMHG
BASE EXCESS BLDA CALC-SCNC: 1.2 MMOL/L (ref 0–2)
BASOPHILS # BLD AUTO: 0.03 10*3/MM3 (ref 0–0.2)
BASOPHILS NFR BLD AUTO: 0.3 % (ref 0–1.5)
BDY SITE: ABNORMAL
BILIRUB SERPL-MCNC: 0.4 MG/DL (ref 0–1.2)
BODY TEMPERATURE: 0 C
BUN SERPL-MCNC: 8 MG/DL (ref 6–20)
BUN/CREAT SERPL: 11 (ref 7–25)
CALCIUM SPEC-SCNC: 9.6 MG/DL (ref 8.6–10.5)
CHLORIDE SERPL-SCNC: 102 MMOL/L (ref 98–107)
CO2 BLDA-SCNC: 27.2 MMOL/L (ref 22–33)
CO2 SERPL-SCNC: 22.5 MMOL/L (ref 22–29)
COHGB MFR BLD: 0.8 % (ref 0–5)
CREAT SERPL-MCNC: 0.73 MG/DL (ref 0.76–1.27)
DEPRECATED RDW RBC AUTO: 39.1 FL (ref 37–54)
EOSINOPHIL # BLD AUTO: 0.16 10*3/MM3 (ref 0–0.4)
EOSINOPHIL NFR BLD AUTO: 1.7 % (ref 0.3–6.2)
ERYTHROCYTE [DISTWIDTH] IN BLOOD BY AUTOMATED COUNT: 12.7 % (ref 12.3–15.4)
GFR SERPL CREATININE-BSD FRML MDRD: 125 ML/MIN/1.73
GLOBULIN UR ELPH-MCNC: 3.2 GM/DL
GLUCOSE SERPL-MCNC: 142 MG/DL (ref 65–99)
HCO3 BLDA-SCNC: 26 MMOL/L (ref 20–26)
HCT VFR BLD AUTO: 46.3 % (ref 37.5–51)
HCT VFR BLD CALC: 47.7 % (ref 38–51)
HGB BLD-MCNC: 14.9 G/DL (ref 13–17.7)
HGB BLDA-MCNC: 15.6 G/DL (ref 14–18)
IMM GRANULOCYTES # BLD AUTO: 0.07 10*3/MM3 (ref 0–0.05)
IMM GRANULOCYTES NFR BLD AUTO: 0.7 % (ref 0–0.5)
INHALED O2 CONCENTRATION: 21 %
LYMPHOCYTES # BLD AUTO: 2.22 10*3/MM3 (ref 0.7–3.1)
LYMPHOCYTES NFR BLD AUTO: 23.1 % (ref 19.6–45.3)
Lab: ABNORMAL
MCH RBC QN AUTO: 27.4 PG (ref 26.6–33)
MCHC RBC AUTO-ENTMCNC: 32.2 G/DL (ref 31.5–35.7)
MCV RBC AUTO: 85.3 FL (ref 79–97)
METHGB BLD QL: 0.4 % (ref 0–3)
MODALITY: ABNORMAL
MONOCYTES # BLD AUTO: 0.52 10*3/MM3 (ref 0.1–0.9)
MONOCYTES NFR BLD AUTO: 5.4 % (ref 5–12)
NEUTROPHILS NFR BLD AUTO: 6.59 10*3/MM3 (ref 1.7–7)
NEUTROPHILS NFR BLD AUTO: 68.8 % (ref 42.7–76)
NOTE: ABNORMAL
NRBC BLD AUTO-RTO: 0 /100 WBC (ref 0–0.2)
NT-PROBNP SERPL-MCNC: 54.4 PG/ML (ref 0–450)
OXYHGB MFR BLDV: 94.9 % (ref 94–99)
PCO2 BLDA: 40.8 MM HG (ref 35–45)
PCO2 TEMP ADJ BLD: ABNORMAL MM[HG]
PH BLDA: 7.41 PH UNITS (ref 7.35–7.45)
PH, TEMP CORRECTED: ABNORMAL
PLATELET # BLD AUTO: 196 10*3/MM3 (ref 140–450)
PMV BLD AUTO: 10.6 FL (ref 6–12)
PO2 BLDA: 76 MM HG (ref 83–108)
PO2 TEMP ADJ BLD: ABNORMAL MM[HG]
POTASSIUM SERPL-SCNC: 3.8 MMOL/L (ref 3.5–5.2)
PROT SERPL-MCNC: 7.3 G/DL (ref 6–8.5)
RBC # BLD AUTO: 5.43 10*6/MM3 (ref 4.14–5.8)
SAO2 % BLDCOA: 96.1 % (ref 94–99)
SODIUM SERPL-SCNC: 139 MMOL/L (ref 136–145)
TROPONIN T SERPL-MCNC: <0.01 NG/ML (ref 0–0.03)
VENTILATOR MODE: ABNORMAL
WBC # BLD AUTO: 9.59 10*3/MM3 (ref 3.4–10.8)

## 2020-07-21 PROCEDURE — 84484 ASSAY OF TROPONIN QUANT: CPT | Performed by: INTERNAL MEDICINE

## 2020-07-21 PROCEDURE — 25010000002 FUROSEMIDE PER 20 MG: Performed by: INTERNAL MEDICINE

## 2020-07-21 PROCEDURE — G0378 HOSPITAL OBSERVATION PER HR: HCPCS

## 2020-07-21 PROCEDURE — 99220 PR INITIAL OBSERVATION CARE/DAY 70 MINUTES: CPT | Performed by: INTERNAL MEDICINE

## 2020-07-21 PROCEDURE — 82805 BLOOD GASES W/O2 SATURATION: CPT

## 2020-07-21 PROCEDURE — 25010000002 ENOXAPARIN PER 10 MG: Performed by: INTERNAL MEDICINE

## 2020-07-21 PROCEDURE — 36600 WITHDRAWAL OF ARTERIAL BLOOD: CPT

## 2020-07-21 PROCEDURE — 96372 THER/PROPH/DIAG INJ SC/IM: CPT

## 2020-07-21 PROCEDURE — 82375 ASSAY CARBOXYHB QUANT: CPT

## 2020-07-21 PROCEDURE — 80053 COMPREHEN METABOLIC PANEL: CPT | Performed by: INTERNAL MEDICINE

## 2020-07-21 PROCEDURE — G0379 DIRECT REFER HOSPITAL OBSERV: HCPCS

## 2020-07-21 PROCEDURE — 83880 ASSAY OF NATRIURETIC PEPTIDE: CPT | Performed by: INTERNAL MEDICINE

## 2020-07-21 PROCEDURE — 96374 THER/PROPH/DIAG INJ IV PUSH: CPT

## 2020-07-21 PROCEDURE — 83050 HGB METHEMOGLOBIN QUAN: CPT

## 2020-07-21 PROCEDURE — 85025 COMPLETE CBC W/AUTO DIFF WBC: CPT | Performed by: INTERNAL MEDICINE

## 2020-07-21 RX ORDER — SODIUM CHLORIDE 0.9 % (FLUSH) 0.9 %
10 SYRINGE (ML) INJECTION AS NEEDED
Status: DISCONTINUED | OUTPATIENT
Start: 2020-07-21 | End: 2020-07-24 | Stop reason: HOSPADM

## 2020-07-21 RX ORDER — FUROSEMIDE 10 MG/ML
20 INJECTION INTRAMUSCULAR; INTRAVENOUS ONCE
Status: COMPLETED | OUTPATIENT
Start: 2020-07-21 | End: 2020-07-21

## 2020-07-21 RX ORDER — CLOPIDOGREL BISULFATE 75 MG/1
75 TABLET ORAL DAILY
Status: DISCONTINUED | OUTPATIENT
Start: 2020-07-21 | End: 2020-07-24

## 2020-07-21 RX ORDER — SODIUM CHLORIDE 0.9 % (FLUSH) 0.9 %
10 SYRINGE (ML) INJECTION EVERY 12 HOURS SCHEDULED
Status: DISCONTINUED | OUTPATIENT
Start: 2020-07-21 | End: 2020-07-24 | Stop reason: HOSPADM

## 2020-07-21 RX ADMIN — NITROGLYCERIN 1 INCH: 20 OINTMENT TOPICAL at 18:27

## 2020-07-21 RX ADMIN — CLOPIDOGREL 75 MG: 75 TABLET, FILM COATED ORAL at 18:18

## 2020-07-21 RX ADMIN — ENOXAPARIN SODIUM 40 MG: 40 INJECTION SUBCUTANEOUS at 20:09

## 2020-07-21 RX ADMIN — FUROSEMIDE 20 MG: 10 INJECTION, SOLUTION INTRAMUSCULAR; INTRAVENOUS at 18:18

## 2020-07-21 NOTE — H&P
History and Physical:  Date of Admit: 2020    Patient Active Problem List   Diagnosis   • Chest pain       Subjective     Chief complaint: Recurrent chest pains.    History of Present Illness: Mr. King is a 28-year-old  male with no history of known coronary artery disease, presents with complaints of having recurrent chest pains over the last several days.  He presented to the ED couple of times recently with chest pains with the most recent one on 2020 where he was noted to have possibly some degree of heart failure on the chest x-ray.  He was however sent home to have cardiac evaluation to our office.  On today's visit, he continues to complain of chest pains which he describes as dull pain on the left side of his chest stable shortness of breath and sometimes sweating.  He also continues to complain of shortness of breath with mild exertion.  His EKG so far is revealed no acute ischemic changes.  His cardiac markers in the ED recently were unremarkable as well.  He gives a positive family history of premature coronary disease in his father.  He is a former smoker smoking 1 pack and a half a day quit in 2018.  He is nondiabetic and nonhypertensive.    Past Medical History:   Diagnosis Date   • Anxiety    • Hypertension      No past surgical history on file.  Family History   Problem Relation Age of Onset   • Heart disease Father    • Heart disease Paternal Grandmother      Social History     Tobacco Use   • Smoking status: Former Smoker     Packs/day: 1.50     Types: Cigarettes     Last attempt to quit: 2018     Years since quittin.5   • Smokeless tobacco: Current User     Types: Snuff   Substance Use Topics   • Alcohol use: No   • Drug use: Defer       No medications prior to admission.       Allergies:  Aspirin    Review of Systems   Constitutional: Negative for appetite change, chills and fever.   HENT: Negative for congestion, ear discharge, ear pain and sore throat.    Eyes: Negative  for pain and redness.   Respiratory: Positive for shortness of breath. Negative for cough and wheezing.    Cardiovascular: Positive for chest pain. Negative for palpitations and leg swelling.   Gastrointestinal: Negative for abdominal pain, diarrhea, nausea and vomiting.   Endocrine: Negative for cold intolerance, heat intolerance, polydipsia and polyuria.   Genitourinary: Negative for dysuria and hematuria.   Skin: Negative for rash.   Neurological: Negative for seizures, syncope and headaches.   Psychiatric/Behavioral: Negative for confusion. The patient is not nervous/anxious.      Objective      Vital Signs  Temp:  [97.4 °F (36.3 °C)-97.7 °F (36.5 °C)] 97.7 °F (36.5 °C)  Heart Rate:  [82-93] 93  Resp:  [16-20] 20  BP: (116-135)/(72-92) 135/92  Body mass index is 39.95 kg/m².  No intake or output data in the 24 hours ending 07/21/20 1802    Physical Exam   Constitutional: He appears well-developed and well-nourished.   HENT:   Head: Normocephalic and atraumatic.   Eyes: EOM are normal. Right eye exhibits no discharge. Left eye exhibits no discharge.   Neck: No JVD present. No tracheal deviation present. No thyromegaly present.   Cardiovascular: S1 normal and S2 normal. PMI is not displaced. Exam reveals no gallop and no friction rub.   Pulmonary/Chest: No stridor. No respiratory distress. He has no wheezes. He has rales (Few rales at the right base.). He exhibits no tenderness.   Abdominal: Soft. He exhibits no distension and no mass. There is no tenderness. There is no guarding.   Musculoskeletal: He exhibits no edema.   Neurological: He is alert.   Skin: Skin is warm and dry. No erythema.   Psychiatric: He has a normal mood and affect.      Results Review:    I reviewed the patient's new clinical results.  Results from last 7 days   Lab Units 07/18/20 1909   TROPONIN T ng/mL <0.010     Results from last 7 days   Lab Units 07/18/20 1909   WBC 10*3/mm3 10.51   HEMOGLOBIN g/dL 14.6   PLATELETS 10*3/mm3 201      Results from last 7 days   Lab Units 07/18/20  1909   SODIUM mmol/L 141   POTASSIUM mmol/L 3.9   CHLORIDE mmol/L 103   CO2 mmol/L 24.7   BUN mg/dL 10   CREATININE mg/dL 0.74*   CALCIUM mg/dL 9.5   GLUCOSE mg/dL 111*   ALT (SGPT) U/L 59*   AST (SGOT) U/L 31         Assessment/Plan :   Assessment:   1. Recurrent chest pains with some typical and some atypical features for CCS class III for angina  2. NYHA class III dyspnea with recent chest x-ray revealing some signs of congestive heart failure.    Assessment & Plan:   Plan:   1. Given his persistent chest pains and shortness of breath and chest x-ray revealing some signs of heart failure, I have discussed with him about admitting to the hospital for further cardiac evaluation management.  Patient is agreeable.  Admitting orders have been placed.  2. We will obtain serial cardiac markers and if these come back negative, will evaluate further with a Lexiscan sestamibi study and echo Doppler study needed.  3. For now we will treat him with Plavix as patient claims allergy to aspirin, IV furosemide, Nitropaste.      Thank you very much for asking us to be involved in this patient's care.  We will follow along with you.      Kevin Petersen MD,[facility]  07/21/20  18:02    Please note that portions of this note were completed with a voice recognition program.

## 2020-07-21 NOTE — PLAN OF CARE
Pt resting in bed with chest pain noted to Left anterior chest, rating it a 7. MD aware. Will continue with plan of care.         Problem: Patient Care Overview  Goal: Plan of Care Review  Outcome: Ongoing (interventions implemented as appropriate)

## 2020-07-21 NOTE — PROGRESS NOTES
Provider, No Known  Don Salamanca  1990 07/21/2020        Subjective     Don Salamanca is a 30 y.o. male with the problems as listed above, presents      CC: Chest pains and shortness of breath.    History of Present Illness:Mr. King is a 28-year-old  male with no history of known coronary artery disease, presents with complaints of having recurrent chest pains over the last several days.  He presented to the ED couple of times recently with chest pains with the most recent one on 7/18/2020 where he was noted to have possibly some degree of heart failure on the chest x-ray.  He was however sent home to have cardiac evaluation to our office.  On today's visit, he continues to complain of chest pains which he describes as dull pain on the left side of his chest stable shortness of breath and sometimes sweating.  He also continues to complain of shortness of breath with mild exertion.  His EKG so far is revealed no acute ischemic changes.  His cardiac markers in the ED recently were unremarkable as well.  He gives a positive family history of premature coronary disease in his father.  He is a former smoker smoking 1 pack and a half a day quit in 2018.  He is nondiabetic and nonhypertensive.      Allergies   Allergen Reactions   • Aspirin Swelling   :      Current Outpatient Medications:   •  pantoprazole (PROTONIX) 20 MG EC tablet, Take 1 tablet by mouth 2 (Two) Times a Day., Disp: 40 tablet, Rfl: 0  •  predniSONE (DELTASONE) 20 MG tablet, Take 1 tablet by mouth Daily., Disp: 5 tablet, Rfl: 0  •  sucralfate (CARAFATE) 1 g tablet, Take 1 tablet by mouth 4 (Four) Times a Day., Disp: 20 tablet, Rfl: 0    Past Medical History:   Diagnosis Date   • Anxiety    • Hypertension      History reviewed. No pertinent surgical history.  Family History   Problem Relation Age of Onset   • Heart disease Father    • Heart disease Paternal Grandmother      Social History     Tobacco Use   • Smoking status: Former Smoker  "    Packs/day: 1.50     Types: Cigarettes     Last attempt to quit: 2018     Years since quittin.5   • Smokeless tobacco: Current User     Types: Snuff   Substance Use Topics   • Alcohol use: No   • Drug use: Defer       Review of Systems   Constitution: Positive for malaise/fatigue.   Cardiovascular: Positive for chest pain and leg swelling.   Respiratory: Positive for shortness of breath.    Musculoskeletal: Positive for joint pain and muscle cramps.   Neurological: Positive for dizziness, headaches, light-headedness, numbness and paresthesias.       Objective   Blood pressure 124/77, pulse 82, temperature 97.4 °F (36.3 °C), resp. rate 16, height 167.6 cm (66\"), weight 114 kg (252 lb 3.2 oz).  Body mass index is 40.71 kg/m².        Physical Exam   Constitutional: He is oriented to person, place, and time. He appears well-developed and well-nourished.   HENT:   Mouth/Throat: Oropharynx is clear and moist.   Eyes: Pupils are equal, round, and reactive to light. EOM are normal.   Neck: Neck supple. No JVD present. No tracheal deviation present. No thyromegaly present.   Cardiovascular: Normal rate, regular rhythm, S1 normal and S2 normal. Exam reveals no gallop and no friction rub.   No murmur heard.  Pulmonary/Chest: Effort normal and breath sounds normal.   Abdominal: Soft. Bowel sounds are normal. He exhibits no mass. There is no tenderness.   Musculoskeletal: Normal range of motion. He exhibits edema.   Lymphadenopathy:     He has no cervical adenopathy.   Neurological: He is alert and oriented to person, place, and time.   Skin: Skin is warm and dry. No rash noted.   Psychiatric: He has a normal mood and affect.       Lab Results   Component Value Date     2020    K 3.9 2020     2020    CO2 24.7 2020    BUN 10 2020    CREATININE 0.74 (L) 2020    GLUCOSE 111 (H) 2020    CALCIUM 9.5 2020    AST 31 2020    ALT 59 (H) 2020    ALKPHOS 84 " 07/18/2020    LABIL2 1.5 01/19/2014     No results found for: CKTOTAL  Lab Results   Component Value Date    WBC 10.51 07/18/2020    HGB 14.6 07/18/2020    HCT 45.5 07/18/2020     07/18/2020     Lab Results   Component Value Date    INR 0.90 07/03/2019     Assessment/Plan    Diagnosis Plan   1. Precordial pain     2. Dyspnea on exertion          Recommendations:  Orders Placed This Encounter   Procedures   • ECG 12 Lead      Since he still having chest pains or shortness of breath and with some signs of heart failure on his chest x-ray, will go ahead and admit him to the hospital for further cardiac evaluation management.         With Best Regards,        Kevin Petersen MD, FACC    Please note that portions of this note were completed with a voice recognition program.

## 2020-07-22 ENCOUNTER — APPOINTMENT (OUTPATIENT)
Dept: CARDIOLOGY | Facility: HOSPITAL | Age: 30
End: 2020-07-22

## 2020-07-22 ENCOUNTER — APPOINTMENT (OUTPATIENT)
Dept: NUCLEAR MEDICINE | Facility: HOSPITAL | Age: 30
End: 2020-07-22

## 2020-07-22 LAB
BH CV ECHO MEAS - % IVS THICK: 26 %
BH CV ECHO MEAS - % LVPW THICK: 49 %
BH CV ECHO MEAS - ACS: 2.2 CM
BH CV ECHO MEAS - AO ROOT AREA (BSA CORRECTED): 1.3
BH CV ECHO MEAS - AO ROOT AREA: 6.8 CM^2
BH CV ECHO MEAS - AO ROOT DIAM: 3 CM
BH CV ECHO MEAS - BSA(HAYCOCK): 2.4 M^2
BH CV ECHO MEAS - BSA: 2.2 M^2
BH CV ECHO MEAS - BZI_BMI: 39 KILOGRAMS/M^2
BH CV ECHO MEAS - BZI_METRIC_HEIGHT: 170.2 CM
BH CV ECHO MEAS - BZI_METRIC_WEIGHT: 112.9 KG
BH CV ECHO MEAS - EDV(CUBED): 147.2 ML
BH CV ECHO MEAS - EDV(MOD-SP4): 72.9 ML
BH CV ECHO MEAS - EDV(TEICH): 134.2 ML
BH CV ECHO MEAS - EF(CUBED): 67.2 %
BH CV ECHO MEAS - EF(MOD-SP4): 67.6 %
BH CV ECHO MEAS - EF(TEICH): 58.3 %
BH CV ECHO MEAS - ESV(CUBED): 48.2 ML
BH CV ECHO MEAS - ESV(MOD-SP4): 23.6 ML
BH CV ECHO MEAS - ESV(TEICH): 55.9 ML
BH CV ECHO MEAS - FS: 31.1 %
BH CV ECHO MEAS - IVS/LVPW: 1
BH CV ECHO MEAS - IVSD: 1 CM
BH CV ECHO MEAS - IVSS: 1.3 CM
BH CV ECHO MEAS - LA DIMENSION: 4.1 CM
BH CV ECHO MEAS - LA/AO: 1.4
BH CV ECHO MEAS - LV DIASTOLIC VOL/BSA (35-75): 32.8 ML/M^2
BH CV ECHO MEAS - LV MASS(C)D: 202.1 GRAMS
BH CV ECHO MEAS - LV MASS(C)DI: 91.1 GRAMS/M^2
BH CV ECHO MEAS - LV MASS(C)S: 180.6 GRAMS
BH CV ECHO MEAS - LV MASS(C)SI: 81.4 GRAMS/M^2
BH CV ECHO MEAS - LV SYSTOLIC VOL/BSA (12-30): 10.6 ML/M^2
BH CV ECHO MEAS - LVIDD: 5.3 CM
BH CV ECHO MEAS - LVIDS: 3.6 CM
BH CV ECHO MEAS - LVLD AP4: 8.4 CM
BH CV ECHO MEAS - LVLS AP4: 7.2 CM
BH CV ECHO MEAS - LVOT AREA (M): 3.1 CM^2
BH CV ECHO MEAS - LVOT AREA: 3.1 CM^2
BH CV ECHO MEAS - LVOT DIAM: 2 CM
BH CV ECHO MEAS - LVPWD: 1 CM
BH CV ECHO MEAS - LVPWS: 1.5 CM
BH CV ECHO MEAS - MV A MAX VEL: 60 CM/SEC
BH CV ECHO MEAS - MV E MAX VEL: 101 CM/SEC
BH CV ECHO MEAS - MV E/A: 1.7
BH CV ECHO MEAS - PA ACC TIME: 0.11 SEC
BH CV ECHO MEAS - PA PR(ACCEL): 28.2 MMHG
BH CV ECHO MEAS - RVDD: 1.3 CM
BH CV ECHO MEAS - SI(CUBED): 44.6 ML/M^2
BH CV ECHO MEAS - SI(MOD-SP4): 22.2 ML/M^2
BH CV ECHO MEAS - SI(TEICH): 35.3 ML/M^2
BH CV ECHO MEAS - SV(CUBED): 99 ML
BH CV ECHO MEAS - SV(MOD-SP4): 49.3 ML
BH CV ECHO MEAS - SV(TEICH): 78.3 ML
CHOLEST SERPL-MCNC: 203 MG/DL (ref 0–200)
HDLC SERPL-MCNC: 31 MG/DL (ref 40–60)
LDLC SERPL CALC-MCNC: ABNORMAL MG/DL
LDLC/HDLC SERPL: ABNORMAL {RATIO}
MAXIMAL PREDICTED HEART RATE: 188 BPM
STRESS TARGET HR: 160 BPM
TRIGL SERPL-MCNC: 563 MG/DL (ref 0–150)
TROPONIN T SERPL-MCNC: <0.01 NG/ML (ref 0–0.03)
TROPONIN T SERPL-MCNC: <0.01 NG/ML (ref 0–0.03)
VLDLC SERPL-MCNC: ABNORMAL MG/DL

## 2020-07-22 PROCEDURE — 84484 ASSAY OF TROPONIN QUANT: CPT | Performed by: INTERNAL MEDICINE

## 2020-07-22 PROCEDURE — G0378 HOSPITAL OBSERVATION PER HR: HCPCS

## 2020-07-22 PROCEDURE — 25010000002 REGADENOSON 0.4 MG/5ML SOLUTION: Performed by: INTERNAL MEDICINE

## 2020-07-22 PROCEDURE — 78452 HT MUSCLE IMAGE SPECT MULT: CPT

## 2020-07-22 PROCEDURE — 93018 CV STRESS TEST I&R ONLY: CPT | Performed by: INTERNAL MEDICINE

## 2020-07-22 PROCEDURE — 96372 THER/PROPH/DIAG INJ SC/IM: CPT

## 2020-07-22 PROCEDURE — 78452 HT MUSCLE IMAGE SPECT MULT: CPT | Performed by: INTERNAL MEDICINE

## 2020-07-22 PROCEDURE — 93010 ELECTROCARDIOGRAM REPORT: CPT | Performed by: INTERNAL MEDICINE

## 2020-07-22 PROCEDURE — 0 TECHNETIUM SESTAMIBI: Performed by: INTERNAL MEDICINE

## 2020-07-22 PROCEDURE — 93306 TTE W/DOPPLER COMPLETE: CPT

## 2020-07-22 PROCEDURE — A9500 TC99M SESTAMIBI: HCPCS | Performed by: INTERNAL MEDICINE

## 2020-07-22 PROCEDURE — 93306 TTE W/DOPPLER COMPLETE: CPT | Performed by: INTERNAL MEDICINE

## 2020-07-22 PROCEDURE — 93005 ELECTROCARDIOGRAM TRACING: CPT | Performed by: INTERNAL MEDICINE

## 2020-07-22 PROCEDURE — 80061 LIPID PANEL: CPT | Performed by: INTERNAL MEDICINE

## 2020-07-22 PROCEDURE — 25010000002 ENOXAPARIN PER 10 MG: Performed by: INTERNAL MEDICINE

## 2020-07-22 PROCEDURE — 99224 PR SBSQ OBSERVATION CARE/DAY 15 MINUTES: CPT | Performed by: INTERNAL MEDICINE

## 2020-07-22 PROCEDURE — 93017 CV STRESS TEST TRACING ONLY: CPT

## 2020-07-22 RX ORDER — ACETAMINOPHEN 500 MG
500 TABLET ORAL EVERY 8 HOURS PRN
Status: DISCONTINUED | OUTPATIENT
Start: 2020-07-22 | End: 2020-07-24 | Stop reason: HOSPADM

## 2020-07-22 RX ADMIN — NITROGLYCERIN 1 INCH: 20 OINTMENT TOPICAL at 18:02

## 2020-07-22 RX ADMIN — REGADENOSON 0.4 MG: 0.08 INJECTION, SOLUTION INTRAVENOUS at 09:07

## 2020-07-22 RX ADMIN — ENOXAPARIN SODIUM 40 MG: 40 INJECTION SUBCUTANEOUS at 19:57

## 2020-07-22 RX ADMIN — TECHNETIUM TC 99M SESTAMIBI 1 DOSE: 1 INJECTION INTRAVENOUS at 07:45

## 2020-07-22 RX ADMIN — NITROGLYCERIN 1 INCH: 20 OINTMENT TOPICAL at 12:27

## 2020-07-22 RX ADMIN — CLOPIDOGREL 75 MG: 75 TABLET, FILM COATED ORAL at 11:05

## 2020-07-22 RX ADMIN — ACETAMINOPHEN 500 MG: 500 TABLET ORAL at 23:00

## 2020-07-22 RX ADMIN — TECHNETIUM TC 99M SESTAMIBI 1 DOSE: 1 INJECTION INTRAVENOUS at 09:07

## 2020-07-22 RX ADMIN — ACETAMINOPHEN 500 MG: 500 TABLET ORAL at 15:25

## 2020-07-22 NOTE — PLAN OF CARE
Pt continues to complain of baseline CP that began 2 weeks ago. Pt does state the CP has subsided. Pt has been NPO since midnight for pending AM stress.

## 2020-07-22 NOTE — PROGRESS NOTES
LOS: 0 days     Name: Don Salamanca  Age/Sex: 30 y.o. male  :  1990        PCP: Provider, No Known  REF: Kevin Petersen MD    Active Problems:    Chest pain      Reason for follow-up: Chest pains and shortness of breath.    Subjective       Subjective  Mr. King is a 28-year-old  male with no history of known coronary artery disease, presents with complaints of having recurrent chest pains over the last several days.  He presented to the ED couple of times recently with chest pains with the most recent one on 2020 where he was noted to have possibly some degree of heart failure on the chest x-ray.  He was however sent home to have cardiac evaluation to our office.  On today's visit, he continues to complain of chest pains which he describes as dull pain on the left side of his chest stable shortness of breath and sometimes sweating.  He also continues to complain of shortness of breath with mild exertion.  His EKG so far is revealed no acute ischemic changes.  His cardiac markers in the ED recently were unremarkable as well.  He gives a positive family history of premature coronary disease in his father.  He is a former smoker smoking 1 pack and a half a day quit in 2018.  He is nondiabetic and nonhypertensive.      Interval History: Patient states that his chest pains are better.  Shortness of breath is better 2.    ROS    Vital Signs  Temp:  [97.4 °F (36.3 °C)-98.1 °F (36.7 °C)] 97.4 °F (36.3 °C)  Heart Rate:  [58-93] 58  Resp:  [16-20] 16  BP: (108-135)/(71-92) 114/71  Vital Signs (last 72 hrs)        0700  -   0659  07  -   0659  07  -   0659  07  -   1028   Most Recent    Temp (°F)     97.4 -  98.1       97.4 (36.3)    Heart Rate     58 -  93       58    Resp     16 -  20       16    BP     108/74 -  135/92       114/71    SpO2 (%)     92 -  96       95        Body mass index is 38.77 kg/m².    Intake/Output Summary (Last 24 hours) at  7/22/2020 1028  Last data filed at 7/22/2020 0837  Gross per 24 hour   Intake 360 ml   Output 350 ml   Net 10 ml     Objective    Objective       Physical Exam:     General Appearance:    Alert, cooperative, in no acute distress   Head:    Normocephalic, without obvious abnormality, atraumatic   Eyes:            Conjunctivae and sclerae normal, no   icterus, no pallor, corneas clear.   Neck:   No adenopathy, supple, trachea midline, no thyromegaly, no   carotid bruit, no JVD   Lungs:     Clear to auscultation,respirations regular, even and                  unlabored    Heart:    Regular rhythm and normal rate, normal S1 and S2, no            murmur, no gallop, no rub, no click   Chest Wall:    No abnormalities observed   Abdomen:     Normal bowel sounds, no masses, no organomegaly, soft        non-tender, non-distended, no guarding, no rebound                tenderness   Extremities:   Moves all extremities well, no edema, no cyanosis, no             redness   Pulses:   Pulses palpable and equal bilaterally   Skin:   No bleeding, bruising or rash              Procedures    Results review       Results Review:   Results from last 7 days   Lab Units 07/21/20 1819 07/18/20 1909   WBC 10*3/mm3 9.59 10.51   HEMOGLOBIN g/dL 14.9 14.6   PLATELETS 10*3/mm3 196 201     Results from last 7 days   Lab Units 07/21/20  1819 07/18/20  1909   SODIUM mmol/L 139 141   POTASSIUM mmol/L 3.8 3.9   CHLORIDE mmol/L 102 103   CO2 mmol/L 22.5 24.7   BUN mg/dL 8 10   CREATININE mg/dL 0.73* 0.74*   CALCIUM mg/dL 9.6 9.5   GLUCOSE mg/dL 142* 111*   ALT (SGPT) U/L 61* 59*   AST (SGOT) U/L 30 31     Results from last 7 days   Lab Units 07/22/20  0733 07/22/20  0116 07/21/20 1819 07/18/20  1909   TROPONIN T ng/mL <0.010 <0.010 <0.010 <0.010     Lab Results   Component Value Date    INR 0.90 07/03/2019     No results found for: MG  Lab Results   Component Value Date    TRIG 563 (H) 07/22/2020    HDL 31 (L) 07/22/2020    LDL  07/22/2020       Comment:      Unable to calculate      Imaging Results (Last 48 Hours)     ** No results found for the last 48 hours. **        No results found for: BNP    Results for VIKASH HILL (MRN [id number]) as of 7/22/2020 10:29   Ref. Range 7/21/2020 18:19   proBNP Latest Ref Range: 0.0 - 450.0 pg/mL 54.4     ECG      Echo         I reviewed the patient's new clinical results.    Telemetry: Sinus 60s and 70s.       Medication Review:     clopidogrel 75 mg Oral Daily   enoxaparin 40 mg Subcutaneous Q24H   nitroglycerin 1 inch Topical Q6H   sodium chloride 10 mL Intravenous Q12H            Assessment    1. Recurrent chest pains with some typical and some atypical features for CCS class III angina, clinically better.  He is ruled out for acute myocardial infarction.  2. Class III dyspnea with no clinical signs of acute heart failure at this time.    Plan   1. Patient she is scheduled for nuclear stress test and echo Doppler study today.  2. Continue with Plavix and Nitropaste for now.      I discussed the patients findings and my recommendations with patient.      BELIA Rahman  07/22/20  10:28    Please note that portions of this note were completed with a voice recognition program.

## 2020-07-22 NOTE — PLAN OF CARE
Patient had stress test today. Currently waiting for results. Complains of chest pain. Nitro paste applied to chest wall and EKG ordered. Nitro helped ease the pain according to patient; EKG was normal. Will continue to monitor and follow plan of care.

## 2020-07-23 ENCOUNTER — APPOINTMENT (OUTPATIENT)
Dept: CT IMAGING | Facility: HOSPITAL | Age: 30
End: 2020-07-23

## 2020-07-23 LAB
BH CV NUCLEAR PRIOR STUDY: 3
BH CV STRESS BP STAGE 1: NORMAL
BH CV STRESS BP STAGE 2: NORMAL
BH CV STRESS COMMENTS STAGE 1: NORMAL
BH CV STRESS COMMENTS STAGE 2: NORMAL
BH CV STRESS DOSE REGADENOSON STAGE 1: 0.4
BH CV STRESS DURATION MIN STAGE 1: 0
BH CV STRESS DURATION MIN STAGE 2: 4
BH CV STRESS DURATION SEC STAGE 1: 10
BH CV STRESS DURATION SEC STAGE 2: 0
BH CV STRESS HR STAGE 1: 102
BH CV STRESS HR STAGE 2: 87
BH CV STRESS PROTOCOL 1: NORMAL
BH CV STRESS RECOVERY BP: NORMAL MMHG
BH CV STRESS RECOVERY HR: 81 BPM
BH CV STRESS STAGE 1: 1
BH CV STRESS STAGE 2: 2
LV EF NUC BP: 55 %
MAXIMAL PREDICTED HEART RATE: 188 BPM
PERCENT MAX PREDICTED HR: 46.28 %
STRESS BASELINE BP: NORMAL MMHG
STRESS BASELINE HR: 57 BPM
STRESS PERCENT HR: 54 %
STRESS POST PEAK BP: NORMAL MMHG
STRESS POST PEAK HR: 87 BPM
STRESS TARGET HR: 160 BPM
TROPONIN T SERPL-MCNC: <0.01 NG/ML (ref 0–0.03)

## 2020-07-23 PROCEDURE — 71260 CT THORAX DX C+: CPT | Performed by: RADIOLOGY

## 2020-07-23 PROCEDURE — 0 IOVERSOL 68 % SOLUTION: Performed by: INTERNAL MEDICINE

## 2020-07-23 PROCEDURE — 93005 ELECTROCARDIOGRAM TRACING: CPT | Performed by: INTERNAL MEDICINE

## 2020-07-23 PROCEDURE — 93010 ELECTROCARDIOGRAM REPORT: CPT | Performed by: INTERNAL MEDICINE

## 2020-07-23 PROCEDURE — G0378 HOSPITAL OBSERVATION PER HR: HCPCS

## 2020-07-23 PROCEDURE — 96372 THER/PROPH/DIAG INJ SC/IM: CPT

## 2020-07-23 PROCEDURE — 25010000002 ENOXAPARIN PER 10 MG: Performed by: INTERNAL MEDICINE

## 2020-07-23 PROCEDURE — 99224 PR SBSQ OBSERVATION CARE/DAY 15 MINUTES: CPT | Performed by: INTERNAL MEDICINE

## 2020-07-23 PROCEDURE — 84484 ASSAY OF TROPONIN QUANT: CPT | Performed by: INTERNAL MEDICINE

## 2020-07-23 PROCEDURE — 71260 CT THORAX DX C+: CPT

## 2020-07-23 RX ADMIN — ACETAMINOPHEN 500 MG: 500 TABLET ORAL at 14:32

## 2020-07-23 RX ADMIN — NITROGLYCERIN 1 INCH: 20 OINTMENT TOPICAL at 20:25

## 2020-07-23 RX ADMIN — IOVERSOL 100 ML: 678 INJECTION INTRA-ARTERIAL; INTRAVENOUS at 21:15

## 2020-07-23 RX ADMIN — ENOXAPARIN SODIUM 40 MG: 40 INJECTION SUBCUTANEOUS at 20:24

## 2020-07-23 RX ADMIN — NITROGLYCERIN 1 INCH: 20 OINTMENT TOPICAL at 11:29

## 2020-07-23 RX ADMIN — SODIUM CHLORIDE, PRESERVATIVE FREE 10 ML: 5 INJECTION INTRAVENOUS at 20:24

## 2020-07-23 RX ADMIN — CLOPIDOGREL 75 MG: 75 TABLET, FILM COATED ORAL at 08:34

## 2020-07-23 RX ADMIN — ACETAMINOPHEN 500 MG: 500 TABLET ORAL at 23:16

## 2020-07-23 RX ADMIN — SODIUM CHLORIDE, PRESERVATIVE FREE 10 ML: 5 INJECTION INTRAVENOUS at 08:34

## 2020-07-23 NOTE — PLAN OF CARE
Pt resting in bed with no complaints. No s/s of distress noted. Will continue with plan of care.       Problem: Patient Care Overview  Goal: Plan of Care Review  Outcome: Ongoing (interventions implemented as appropriate)

## 2020-07-23 NOTE — PROGRESS NOTES
LOS: 0 days     Name: Don Salamanca  Age/Sex: 30 y.o. male  :  1990        PCP: Provider, No Known  REF: Kevin Petersen MD    Active Problems:    Chest pain      Reason for follow-up: Shortness of breath and Chest pain     Subjective       Subjective     Mr. King is a 28-year-old  male with no history of known coronary artery disease, presents with complaints of having recurrent chest pains over the last several days.  He presented to the ED couple of times recently with chest pains with the most recent one on 2020 where he was noted to have possibly some degree of heart failure on the chest x-ray.  He was however sent home to have cardiac evaluation to our office.  On today's visit, he continues to complain of chest pains which he describes as dull pain on the left side of his chest stable shortness of breath and sometimes sweating.  He also continues to complain of shortness of breath with mild exertion.  His EKG so far is revealed no acute ischemic changes.  His cardiac markers in the ED recently were unremarkable as well.  He gives a positive family history of premature coronary disease in his father.  He is a former smoker smoking 1 pack and a half a day quit in 2018.  He is nondiabetic and nonhypertensive.    Interval History: Stress test yesterday showed small sized mild degree of ischemia in the inferior wall.  Echocardiogram revealed normal LV function. Patient still reports some shortness of breath but it is improved today.     Vital Signs  Temp:  [97.2 °F (36.2 °C)-98.4 °F (36.9 °C)] 98 °F (36.7 °C)  Heart Rate:  [58-78] 58  Resp:  [-] 18  BP: ()/(47-76) 104/60     Vital Signs (last 72 hrs)        07  -   0659  07  -   0659  07  -   0659 700  -   1021   Most Recent    Temp (°F)   97.4 -  98.1    97.2 -  98.4      98     98 (36.7)    Heart Rate   58 -  93    61 -  78      58     58    Resp   16 -  20     -  18      18      18    BP   108/74 -  135/92    99/47 -  123/53      104/60     104/60    SpO2 (%)   92 -  96    93 -  98      98     98        Body mass index is 38.77 kg/m².    Intake/Output Summary (Last 24 hours) at 7/23/2020 1021  Last data filed at 7/23/2020 0951  Gross per 24 hour   Intake 2460 ml   Output 1440 ml   Net 1020 ml     Objective    Objective       Physical Exam:     General Appearance:    Alert, cooperative, in no acute distress   Head:    Normocephalic, without obvious abnormality, atraumatic   Eyes:            Conjunctivae and sclerae normal, no   icterus, no pallor, corneas clear.   Neck:   No adenopathy, supple, trachea midline, no thyromegaly, no   carotid bruit, no JVD   Lungs:     Clear to auscultation,respirations regular, even and                  unlabored    Heart:    Regular rhythm and normal rate, normal S1 and S2, no            murmur, no gallop, no rub, no click   Chest Wall:    No abnormalities observed   Abdomen:     Normal bowel sounds, no masses, no organomegaly, soft        non-tender, non-distended, no guarding, no rebound                tenderness   Extremities:   Moves all extremities well, no edema, no cyanosis, no             redness   Pulses:   Pulses palpable and equal bilaterally   Skin:   No bleeding, bruising or rash       Neurologic:   Alert and oriented   I have seen and performed a physical examination today.     Results review       Results Review:   Results from last 7 days   Lab Units 07/21/20 1819 07/18/20 1909   WBC 10*3/mm3 9.59 10.51   HEMOGLOBIN g/dL 14.9 14.6   PLATELETS 10*3/mm3 196 201     Results from last 7 days   Lab Units 07/21/20 1819 07/18/20  1909   SODIUM mmol/L 139 141   POTASSIUM mmol/L 3.8 3.9   CHLORIDE mmol/L 102 103   CO2 mmol/L 22.5 24.7   BUN mg/dL 8 10   CREATININE mg/dL 0.73* 0.74*   CALCIUM mg/dL 9.6 9.5   GLUCOSE mg/dL 142* 111*   ALT (SGPT) U/L 61* 59*   AST (SGOT) U/L 30 31     Results from last 7 days   Lab Units 07/23/20  0867  07/22/20  0733 07/22/20  0116 07/21/20  1819 07/18/20  1909   TROPONIN T ng/mL <0.010 <0.010 <0.010 <0.010 <0.010     Lab Results   Component Value Date    INR 0.90 07/03/2019     No results found for: MG  Lab Results   Component Value Date    TRIG 563 (H) 07/22/2020    HDL 31 (L) 07/22/2020    LDL  07/22/2020      Comment:      Unable to calculate      Imaging Results (Last 48 Hours)     ** No results found for the last 48 hours. **          Echo   Results for orders placed during the hospital encounter of 07/21/20   Adult Transthoracic Echo Complete W/ Cont if Necessary Per Protocol    Narrative · Normal left ventricular cavity size and wall thickness noted. All left   ventricular wall segments contract normally.  · Estimated EF appears to be in the range of 51 - 55%.  · The aortic valve is structurally normal. No aortic valve regurgitation   is present. No aortic valve stenosis is present.  · The mitral valve is normal in structure. No mitral valve regurgitation   is present. No significant mitral valve stenosis is present.  · The tricuspid valve is normal. No tricuspid valve regurgitation is   present.  · There is no evidence of pericardial effusion.          Nuclear Stress Test  · A pharmacological stress test was performed using regadenoson without low-level exercise.  · Findings consistent with a normal ECG stress test.  · Myocardial perfusion imaging indicates a small-sized, mild degree of inferior wall ischemia .  · Left ventricular ejection fraction is normal (Calculated EF = 55%).  · Impressions are consistent with a low risk study.     I reviewed the patient's new clinical results.    Telemetry: NSR 50s to 60s.       Medication Review:     clopidogrel 75 mg Oral Daily   enoxaparin 40 mg Subcutaneous Q24H   nitroglycerin 1 inch Topical Q6H   sodium chloride 10 mL Intravenous Q12H            Assessment      Assessment:  1. Recurrent chest pains with some typical and some atypical features of CCS class III  angina  2. Abnormal nuclear stress test which showed small sized moderate ischemia in the inferior wall  3. Class III dyspnea but no clinical signs of acute heart failure at this time  4. Hypertriglyceridemia    Plan     Recommendations:  1. We will obtain a CT scan of the chest PE protocol to rule out PE as patient still complains of chest pains and shortness of breath.  2. We will consider pulmonary service if needed.    I discussed the patients findings and my recommendations with patient and family      Guerita SánchezSHERWIN  07/23/20  10:21    I, Kevin Petersen MD, FACC, personally performed the services described in this documentation as documented by the above named individual under my supervision and made necessary changes and the note is both accurate and complete.     Kevin Petersen MD, FACC  7/23/2020  10:57 AM      Please note that portions of this note were completed with a voice recognition program.

## 2020-07-23 NOTE — ED PROVIDER NOTES
Subjective   Patient presents to ER with chest pain and shortness of breath.      Chest Pain   Pain location:  Unable to specify  Pain quality: sharp    Pain radiates to:  Does not radiate  Pain severity:  Moderate  Onset quality:  Gradual  Timing:  Intermittent  Progression:  Waxing and waning  Chronicity:  Recurrent  Context: breathing    Associated symptoms: fatigue and shortness of breath        Review of Systems   Constitutional: Positive for activity change and fatigue.   HENT: Negative.    Eyes: Negative.    Respiratory: Positive for shortness of breath.    Cardiovascular: Positive for chest pain.   Gastrointestinal: Negative.    Endocrine: Negative.    Genitourinary: Negative.    Musculoskeletal: Negative.    Skin: Negative.    Allergic/Immunologic: Negative.    Neurological: Negative.    Hematological: Negative.    Psychiatric/Behavioral: Negative.        Past Medical History:   Diagnosis Date   • Anxiety    • Hypertension        Allergies   Allergen Reactions   • Aspirin Swelling       No past surgical history on file.    Family History   Problem Relation Age of Onset   • Heart disease Father    • Heart disease Paternal Grandmother        Social History     Socioeconomic History   • Marital status: Single     Spouse name: Not on file   • Number of children: Not on file   • Years of education: Not on file   • Highest education level: Not on file   Tobacco Use   • Smoking status: Former Smoker     Packs/day: 1.50     Types: Cigarettes     Last attempt to quit: 2018     Years since quittin.5   • Smokeless tobacco: Current User     Types: Snuff   Substance and Sexual Activity   • Alcohol use: No   • Drug use: Defer   • Sexual activity: Defer           Objective   Physical Exam   Constitutional: He appears well-developed and well-nourished.   HENT:   Head: Normocephalic.   Eyes: Pupils are equal, round, and reactive to light.   Neck: Normal range of motion.   Cardiovascular: Normal rate, regular rhythm and  normal pulses.   Pulmonary/Chest: Effort normal. He has decreased breath sounds in the right middle field, the right lower field, the left middle field and the left lower field.   Abdominal: He exhibits distension.   Musculoskeletal: Normal range of motion.        Right lower leg: Normal.        Left lower leg: Normal.   Neurological: He is alert.   Skin: Skin is warm. Capillary refill takes less than 2 seconds.   Psychiatric: He has a normal mood and affect.   Nursing note and vitals reviewed.      Procedures           ED Course                                           MDM    Final diagnoses:   Atypical chest pain            David Reddy MD  07/23/20 7805

## 2020-07-23 NOTE — PLAN OF CARE
PT w/ complaints of headache. Nitro paste removed at pt's request w/ refusal of future doses att. Pt has rested comfortably and states he feels much better today than yesterday.

## 2020-07-24 VITALS
BODY MASS INDEX: 38.55 KG/M2 | RESPIRATION RATE: 18 BRPM | HEART RATE: 86 BPM | DIASTOLIC BLOOD PRESSURE: 78 MMHG | OXYGEN SATURATION: 96 % | HEIGHT: 67 IN | WEIGHT: 245.6 LBS | TEMPERATURE: 97.5 F | SYSTOLIC BLOOD PRESSURE: 118 MMHG

## 2020-07-24 PROBLEM — R94.39 ABNORMAL NUCLEAR STRESS TEST: Status: ACTIVE | Noted: 2020-07-21

## 2020-07-24 PROBLEM — I20.9 ANGINA, CLASS III (HCC): Status: ACTIVE | Noted: 2020-07-21

## 2020-07-24 LAB
SARS-COV-2 RDRP RESP QL NAA+PROBE: NOT DETECTED
TROPONIN T SERPL-MCNC: <0.01 NG/ML (ref 0–0.03)

## 2020-07-24 PROCEDURE — 87635 SARS-COV-2 COVID-19 AMP PRB: CPT | Performed by: INTERNAL MEDICINE

## 2020-07-24 PROCEDURE — 0 IOPAMIDOL PER 1 ML: Performed by: INTERNAL MEDICINE

## 2020-07-24 PROCEDURE — C1769 GUIDE WIRE: HCPCS | Performed by: INTERNAL MEDICINE

## 2020-07-24 PROCEDURE — 93005 ELECTROCARDIOGRAM TRACING: CPT | Performed by: INTERNAL MEDICINE

## 2020-07-24 PROCEDURE — 25010000002 HEPARIN (PORCINE) PER 1000 UNITS: Performed by: INTERNAL MEDICINE

## 2020-07-24 PROCEDURE — 99024 POSTOP FOLLOW-UP VISIT: CPT | Performed by: INTERNAL MEDICINE

## 2020-07-24 PROCEDURE — G0378 HOSPITAL OBSERVATION PER HR: HCPCS

## 2020-07-24 PROCEDURE — 93458 L HRT ARTERY/VENTRICLE ANGIO: CPT | Performed by: INTERNAL MEDICINE

## 2020-07-24 PROCEDURE — 84484 ASSAY OF TROPONIN QUANT: CPT | Performed by: INTERNAL MEDICINE

## 2020-07-24 PROCEDURE — C1894 INTRO/SHEATH, NON-LASER: HCPCS | Performed by: INTERNAL MEDICINE

## 2020-07-24 PROCEDURE — 93010 ELECTROCARDIOGRAM REPORT: CPT | Performed by: INTERNAL MEDICINE

## 2020-07-24 RX ORDER — ACETAMINOPHEN 325 MG/1
650 TABLET ORAL EVERY 4 HOURS PRN
Status: CANCELLED | OUTPATIENT
Start: 2020-07-24

## 2020-07-24 RX ORDER — LIDOCAINE HYDROCHLORIDE 20 MG/ML
INJECTION, SOLUTION INFILTRATION; PERINEURAL AS NEEDED
Status: DISCONTINUED | OUTPATIENT
Start: 2020-07-24 | End: 2020-07-24 | Stop reason: HOSPADM

## 2020-07-24 RX ORDER — DIPHENHYDRAMINE HYDROCHLORIDE 50 MG/ML
50 INJECTION INTRAMUSCULAR; INTRAVENOUS ONCE
Status: DISCONTINUED | OUTPATIENT
Start: 2020-07-24 | End: 2020-07-24

## 2020-07-24 RX ORDER — PANTOPRAZOLE SODIUM 40 MG/1
40 TABLET, DELAYED RELEASE ORAL DAILY
Qty: 30 TABLET | Refills: 1 | Status: SHIPPED | OUTPATIENT
Start: 2020-07-24 | End: 2020-07-25

## 2020-07-24 RX ORDER — LISINOPRIL 2.5 MG/1
5 TABLET ORAL DAILY
Status: CANCELLED | OUTPATIENT
Start: 2020-07-24

## 2020-07-24 RX ORDER — ROSUVASTATIN CALCIUM 10 MG/1
10 TABLET, COATED ORAL NIGHTLY
Status: CANCELLED | OUTPATIENT
Start: 2020-07-24

## 2020-07-24 RX ORDER — ASPIRIN 81 MG/1
81 TABLET ORAL DAILY
Status: CANCELLED | OUTPATIENT
Start: 2020-07-24

## 2020-07-24 RX ORDER — SODIUM CHLORIDE 9 MG/ML
INJECTION, SOLUTION INTRAVENOUS CONTINUOUS PRN
Status: COMPLETED | OUTPATIENT
Start: 2020-07-24 | End: 2020-07-24

## 2020-07-24 RX ORDER — PANTOPRAZOLE SODIUM 40 MG/1
40 TABLET, DELAYED RELEASE ORAL
Status: DISCONTINUED | OUTPATIENT
Start: 2020-07-24 | End: 2020-07-24 | Stop reason: HOSPADM

## 2020-07-24 RX ORDER — SODIUM CHLORIDE 9 MG/ML
100 INJECTION, SOLUTION INTRAVENOUS CONTINUOUS
Status: CANCELLED | OUTPATIENT
Start: 2020-07-24

## 2020-07-24 RX ADMIN — CLOPIDOGREL 75 MG: 75 TABLET, FILM COATED ORAL at 08:31

## 2020-07-24 RX ADMIN — NITROGLYCERIN 1 INCH: 20 OINTMENT TOPICAL at 11:32

## 2020-07-24 NOTE — PLAN OF CARE
Patient is resting quietly. Denies any chest pain this shift. Will continue to monitor and follow plan of care.

## 2020-07-24 NOTE — DISCHARGE INSTR - APPOINTMENTS
NO PCP.  PT GIVEN LIST OF PROVIDERS TO CHOOSE FROM.  [facility] OFFICE CLOSED.  MON AM UNIT SEC WILL SCHEDULE AND CALL PT.

## 2020-07-24 NOTE — PROGRESS NOTES
"   LOS: 0 days     Name: Don Salamanca  Age/Sex: 30 y.o. male  :  1990        PCP: Provider, No Known    Active Problems:    Chest pain    Following for: shortness of breath and chest pain    Telemetry Monitoring: sinus rhythm in the 50s    Interval history: The patient is resting in bed. He states his breathing is better. He continues to have intermittent chest pains, worse when he ambulates. This feels like a \"squeezing\" pain in the left chest wall with no radiation. This improves when resting. A CT of the chest with PE protocol was unremarkable with the exception of diffuse fatty liver.     Subjective     ROS    Vital Signs  Vitals:    20 1910 20 0248 20 0500 20 0632   BP: 107/59 98/53  109/59   BP Location: Left arm Left arm  Left arm   Patient Position: Lying Lying  Lying   Pulse: 69 60  55   Resp: 18 18  18   Temp: 98.7 °F (37.1 °C) 97.7 °F (36.5 °C)  98 °F (36.7 °C)   TempSrc: Oral Oral  Oral   SpO2: 94% 94%  97%   Weight:   111 kg (245 lb 9.6 oz)    Height:         Body mass index is 38.24 kg/m².      Intake/Output Summary (Last 24 hours) at 2020 1009  Last data filed at 2020 0400  Gross per 24 hour   Intake 1600 ml   Output 2050 ml   Net -450 ml       Physical Exam   Constitutional: He is oriented to person, place, and time. He appears well-developed and well-nourished.   HENT:   Head: Normocephalic and atraumatic.   Cardiovascular: Normal rate, regular rhythm and normal heart sounds. Exam reveals no S3 and no S4.   No murmur heard.  Pulmonary/Chest: Effort normal and breath sounds normal. He has no wheezes. He has no rales.   Abdominal: Soft. Bowel sounds are normal.   Musculoskeletal: He exhibits no edema.   Neurological: He is alert and oriented to person, place, and time.   Skin: Skin is warm and dry.   Psychiatric: He has a normal mood and affect. His behavior is normal.          Results Review:     Results from last 7 days   Lab Units 20  1819 " 07/18/20 1909   WBC 10*3/mm3 9.59 10.51   HEMOGLOBIN g/dL 14.9 14.6   PLATELETS 10*3/mm3 196 201     Results from last 7 days   Lab Units 07/21/20 1819 07/18/20 1909   SODIUM mmol/L 139 141   POTASSIUM mmol/L 3.8 3.9   CHLORIDE mmol/L 102 103   CO2 mmol/L 22.5 24.7   BUN mg/dL 8 10   CREATININE mg/dL 0.73* 0.74*   CALCIUM mg/dL 9.6 9.5   GLUCOSE mg/dL 142* 111*     Results from last 7 days   Lab Units 07/23/20  0852 07/22/20  0733 07/22/20  0116 07/21/20 1819 07/18/20 1909   TROPONIN T ng/mL <0.010 <0.010 <0.010 <0.010 <0.010             I reviewed the patient's new clinical results.  I reviewed the patient's new imaging results and agree with the interpretation.  I personally viewed and interpreted the patient's EKG/Telemetry data      Medication Review:     clopidogrel 75 mg Oral Daily   enoxaparin 40 mg Subcutaneous Q24H   nitroglycerin 1 inch Topical Q6H   sodium chloride 10 mL Intravenous Q12H          Assessment:  1. Recurrent chest pains with some typical and some atypical features of CCS class III angina  2. Abnormal nuclear stress test which showed small sized moderate ischemia in the inferior wall  3. Class III dyspnea but no clinical signs of acute heart failure at this time  4. Hypertriglyceridemia      Recommendations:  1. In view of persistent chest pains and abnormal nuclear stress test, I discussed with her about option of further cardiac evaluation with cardiac catheterization.  I discussed the procedure, potential risks and benefits and alternatives.  He expressed understanding of same and is wanting to proceed.  We will schedule this for this afternoon.    I discussed the patients findings and my recommendations with patient and family      SHERWIN Guidry,    07/24/20  10:09 AM    Addendum: I, Kevin Petersen MD, [facility], personally performed the services described in this documentation as documented by the above named individual under my supervision and made necessary changes and the  note is both accurate and complete.     Kevin Petersen MD, FACC  07/24/20  10:09

## 2020-07-24 NOTE — DISCHARGE SUMMARY
"Patient Identification:  Name:  Don Salamanca  Age:  30 y.o.  Sex:  male  :  1990  MRN:  [id number]  Visit Number:  [id number]    Date of Admission: 2020  Date of Discharge:  2020    PCP: Provider, No Known    DISCHARGE DIAGNOSIS  1. Chest pains, probably noncardiac.  2. 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      PROCEDURES PERFORMED  1. Left heart catheterization with coronary angiography.    HOSPITAL COURSE  Mr. King is a 28-year-old  male who presented with recurrent severe chest pains and shortness of breath.  He was admitted for further cardiac evaluation management.  Please review the dictated history and physical for details.    Patient was admitted telemetry floor and was treated initially with Plavix and Nitropaste.  He ruled out for acute myocardial infarction.  He underwent Lexiscan sestamibi study that revealed areas of myocardial ischemia.  The plan was to treat him medically.  However he continues to have significant chest pains and shortness of breath for which a CT chest for PE was obtained which was reported to be negative for PE and no other significant abnormalities noted in the chest.  In view of continued chest pains, he was given the option of further cardiac evaluation with cardiac catheterization which he was agreeable.  This was performed without any immediate complications.  This revealed findings as described below.  Patient had previous history of syncope for which plans are being made to evaluate him as an outpatient with an event monitor.  His rhythm was stable during the hospital course.  Rest of the hospital course was uneventful.      CONDITION ON DISCHARGE  Stable.    VITAL SIGNS  /64 (BP Location: Left arm, Patient Position: Lying)   Pulse 70   Temp 98 °F (36.7 °C) (Oral)   Resp 18   Ht 170.7 cm (67.2\")   Wt 111 kg (245 lb 9.6 oz)   SpO2 " 98%   BMI 38.24 kg/m²     DISCHARGE PHYSICAL EXAM  Alert, oriented x3 in no apparent distress  Lungs were clear to auscultation bilateral.  Cardiac examination reveals regular rate rhythm with normal S1 and S2.  Abdomen was benign  Extremities reveal no edema.  Cath site on the right wrist was stable.    Results Review:   Results from last 7 days   Lab Units 07/21/20  1819 07/18/20  1909   WBC 10*3/mm3 9.59 10.51   HEMOGLOBIN g/dL 14.9 14.6   PLATELETS 10*3/mm3 196 201     Results from last 7 days   Lab Units 07/21/20  1819 07/18/20  1909   SODIUM mmol/L 139 141   POTASSIUM mmol/L 3.8 3.9   CHLORIDE mmol/L 102 103   CO2 mmol/L 22.5 24.7   BUN mg/dL 8 10   CREATININE mg/dL 0.73* 0.74*   CALCIUM mg/dL 9.6 9.5   GLUCOSE mg/dL 142* 111*   ALT (SGPT) U/L 61* 59*   AST (SGOT) U/L 30 31     Results from last 7 days   Lab Units 07/24/20  1454 07/23/20  0852 07/22/20  0733 07/22/20  0116 07/21/20  1819   TROPONIN T ng/mL <0.010 <0.010 <0.010 <0.010 <0.010     Lab Results   Component Value Date    INR 0.90 07/03/2019     No results found for: MG  Lab Results   Component Value Date    TRIG 563 (H) 07/22/2020    HDL 31 (L) 07/22/2020    LDL  07/22/2020      Comment:      Unable to calculate      Cardiac catheterization revealed:  Results:  Hemodynamics:     LVEDP was about 10 mmHg with no gradient across that valve on pullback.     Coronary angiograms     Left main coronary artery is normal and gives rise to a medium size LAD and a medium size ramus medianus branch.     Left artery descending coronary artery with the diagonal branches appears angiographically normal.     Ramus medianus branch is a medium caliber vessel and appears angiographically normal.     Left circumflex coronary artery has anomalous origin from the right coronary cusp and appears angiographically normal.     Right coronary artery is codominant for posterior circulation and appears angiographically normal as well.     Conclusion:  3. Normal left main, left  anterior descending and ramus medianus branches.  4. Anomalous origin of the left circumflex from the right coronary cusp with no significant coronary artery disease noted.  5. Right coronary artery is codominant for posterior circulation with no significant disease.     Recommendations: Continue to emphasize on risk factor modification as needed for now.     Kevin Petersen MD [facility].  7/24/2020       CT chest with contrast revealed:  FINDINGS:     Lungs: Unremarkable. No parenchymal soft tissue nodules.  No focal air  space disease.  Heart: Unremarkable.  Pericardium: No effusion.  Mediastinum: No masses. No enlarged lymph nodes.  No fluid collections.  Pleura: No pleural effusion. No pleural mass or abnormal calcification.  No pneumothorax.  Major airways: Clear. No intrinsic mass.  Vasculature: No evidence of aneurysm. No evidence of pulmonary embolism.  Visualized upper abdomen:Diffuse fatty infiltration of liver.  Other: Chronic or mass can't.  Bones: No acute bony abnormality.     IMPRESSION:  1. No acute thoracic findings.  2. Fatty liver.     This report was finalized on 7/23/2020 8:40 PM by Dr. Markel Siegel MD.      DISCHARGE DISPOSITION   Home or Self Care    DISCHARGE MEDICATIONS     Discharge Medications      New Medications      Instructions Start Date   pantoprazole 40 MG EC tablet  Commonly known as:  PROTONIX   40 mg, Oral, Daily             Follow-up: In our office in 1 to 2 weeks  Patient was asked to come by our office on Monday to have a event monitor placed.  No driving for about 3 months.  Diet: Low-carb diet  Activity as tolerated.          TEST  RESULTS PENDING AT DISCHARGE  None.       Kevin Petersen MD,[facility]  07/24/20  18:28      Time: greater than 30 minutes.    Please send a copy of this dictation to the following providers:  Provider, No Known  ----------------------------------------------------------------------------------------------------------------------  Please note that  portions of this note were completed with a voice recognition program.

## 2020-07-24 NOTE — PLAN OF CARE
Pt resting in bed with no complaints. Pt had heart cath today. Shows no s/s of distress. Will continue with plan of care.       Problem: Patient Care Overview  Goal: Plan of Care Review  Outcome: Ongoing (interventions implemented as appropriate)

## 2020-07-25 RX ORDER — PANTOPRAZOLE SODIUM 40 MG/1
40 TABLET, DELAYED RELEASE ORAL DAILY
Qty: 30 TABLET | Refills: 1 | Status: SHIPPED | OUTPATIENT
Start: 2020-07-25 | End: 2020-08-19

## 2020-07-25 NOTE — NURSING NOTE
Patient ready for discharge. D/c instructions explained, Pt verbalizes understanding. No bleeding from right radial after TR band removed. Patient educated to come to ER if bleeding occurs. Patient denies pain. VS stable. Patient walked out to his ride who was waiting on him, denied wheelchair. Tele removed. Iv removed. All belongings taken by pt.

## 2020-07-28 ENCOUNTER — TELEPHONE (OUTPATIENT)
Dept: CARDIOLOGY | Facility: CLINIC | Age: 30
End: 2020-07-28

## 2020-07-28 NOTE — TELEPHONE ENCOUNTER
Called pt to advise them they can come by the office any day in between 8 am and 11 am to have there monitor put on or we can mail it. If they want it mailed please confirm their address.     Pt stated that he will try to come by the office tomorrow if not tomorrow then Thursday.

## 2020-07-29 ENCOUNTER — DOCUMENTATION (OUTPATIENT)
Dept: CARDIAC REHAB | Facility: HOSPITAL | Age: 30
End: 2020-07-29

## 2020-08-19 ENCOUNTER — OFFICE VISIT (OUTPATIENT)
Dept: CARDIOLOGY | Facility: CLINIC | Age: 30
End: 2020-08-19

## 2020-08-19 VITALS
DIASTOLIC BLOOD PRESSURE: 81 MMHG | RESPIRATION RATE: 14 BRPM | HEIGHT: 67 IN | BODY MASS INDEX: 40.3 KG/M2 | TEMPERATURE: 98.3 F | HEART RATE: 57 BPM | WEIGHT: 256.8 LBS | OXYGEN SATURATION: 96 % | SYSTOLIC BLOOD PRESSURE: 124 MMHG

## 2020-08-19 DIAGNOSIS — R07.2 PRECORDIAL PAIN: Primary | ICD-10-CM

## 2020-08-19 PROCEDURE — 99214 OFFICE O/P EST MOD 30 MIN: CPT | Performed by: PHYSICIAN ASSISTANT

## 2020-08-19 NOTE — PROGRESS NOTES
Provider, No Known  Don Salamanca  2020    Patient Active Problem List   Diagnosis   • Chest pain   • Abnormal nuclear stress test   • Angina, class III (CMS/HCC)       Dear Provider, No Known:    Subjective     History of Present Illness:    Chief Complaint   Patient presents with   • Follow-up     2 week hospital    • Med Management     verbal       Don Salamanca is a pleasant 30 y.o. male with a past medical history significant for precordial pain with concern of possible acute heart failure patient was recently admitted into [facility] he did have a proBNP that was normal and due to persistent chest pains did have left heart catheterization which come back normal for coronary artery disease but did have congenital anomaly with left circumflex artery originating from the right coronary cusp.  Patient comes in for hospital follow-up.    She reports that he still does have chest pains that is a burning sensation in the center of his chest.  He reports that he does still have his gallbladder and has not had an EGD.  He denies any shortness of breath, dizziness, or syncope although he does report that he has been on short-term disability due to an episode of syncope however reviewing all notes in our medical system no report of syncope has ever been listed.  Syncope on 2020.    Allergies   Allergen Reactions   • Aspirin Swelling   :    No current outpatient medications on file.    The following portions of the patient's history were reviewed and updated as appropriate: allergies, current medications, past family history, past medical history, past social history, past surgical history and problem list.    Social History     Tobacco Use   • Smoking status: Former Smoker     Packs/day: 1.50     Types: Cigarettes     Last attempt to quit: 2018     Years since quittin.6   • Smokeless tobacco: Current User     Types: Snuff   Substance Use Topics   • Alcohol use: No   • Drug  "use: Defer       Review of Systems   Constitution: Negative for malaise/fatigue.   Cardiovascular: Positive for chest pain. Negative for dyspnea on exertion and irregular heartbeat.   Respiratory: Negative for cough and shortness of breath.    Hematologic/Lymphatic: Negative for bleeding problem. Does not bruise/bleed easily.   Gastrointestinal: Negative for nausea and vomiting.   Neurological: Negative for weakness.       Objective   Vitals:    08/19/20 0843   BP: 124/81   BP Location: Left arm   Patient Position: Sitting   Cuff Size: Large Adult   Pulse: 57   Resp: 14   Temp: 98.3 °F (36.8 °C)   TempSrc: Temporal   SpO2: 96%   Weight: 116 kg (256 lb 12.8 oz)   Height: 170.7 cm (67.21\")     Body mass index is 39.98 kg/m².    Physical Exam   Constitutional: He is oriented to person, place, and time. He appears well-developed and well-nourished. No distress.   HENT:   Head: Normocephalic and atraumatic.   Cardiovascular: Normal rate, regular rhythm and normal heart sounds.   Pulmonary/Chest: Effort normal and breath sounds normal. No respiratory distress.   Musculoskeletal: He exhibits no edema.   Neurological: He is alert and oriented to person, place, and time.   Skin: He is not diaphoretic.       Lab Results   Component Value Date     07/21/2020    K 3.8 07/21/2020     07/21/2020    CO2 22.5 07/21/2020    BUN 8 07/21/2020    CREATININE 0.73 (L) 07/21/2020    GLUCOSE 142 (H) 07/21/2020    CALCIUM 9.6 07/21/2020    AST 30 07/21/2020    ALT 61 (H) 07/21/2020    ALKPHOS 83 07/21/2020    LABIL2 1.5 01/19/2014     No results found for: CKTOTAL  Lab Results   Component Value Date    WBC 9.59 07/21/2020    HGB 14.9 07/21/2020    HCT 46.3 07/21/2020     07/21/2020     Lab Results   Component Value Date    INR 0.90 07/03/2019     No results found for: MG  Lab Results   Component Value Date    TRIG 563 (H) 07/22/2020    HDL 31 (L) 07/22/2020    LDL  07/22/2020      Comment:      Unable to calculate      No " results found for: BNP    During this visit the following were done:  Labs Reviewed [x]    Labs Ordered []    Radiology Reports Reviewed [x]    Radiology Ordered []    PCP Records Reviewed []    Referring Provider Records Reviewed []    ER Records Reviewed []    Hospital Records Reviewed []    History Obtained From Family []    Radiology Images Reviewed []    Other Reviewed []    Records Requested []       Procedures    Assessment/Plan    Diagnosis Plan   1. Precordial pain  US gallbladder    Duplex Carotid Ultrasound CAR            Recommendations:  1. We will order gallbladder ultrasound for GI work-up for his chest pains.  2. Patient also brought in disability papers to fill out I will fill these out the best my ability he reports that he stopped working on 7/13/2020 however he never establish care with us until 7/21/2020 and he has been without work since that date.  He does report he had an episode of syncope on 7/18/2020 although he never reported this to our office cardiovascular work-up has been essentially unremarkable patient can be cleared to return back to work tomorrow on 7/20/2020.  3. I will order carotid ultrasound since he did report some episodes of syncope.  4. I also strongly encouraged him to pursue noncardiac work-up for his chest pains including an EGD with his PCP.    Return in about 3 months (around 11/19/2020).    As always, I appreciate very much the opportunity to participate in the cardiovascular care of your patients.      With Best Regards,    Narendra Clark PA-C

## 2020-08-26 ENCOUNTER — HOSPITAL ENCOUNTER (OUTPATIENT)
Dept: CARDIOLOGY | Facility: HOSPITAL | Age: 30
Discharge: HOME | End: 2020-08-26

## 2020-08-26 ENCOUNTER — HOSPITAL ENCOUNTER (OUTPATIENT)
Dept: ULTRASOUND IMAGING | Facility: HOSPITAL | Age: 30
Discharge: HOME | End: 2020-08-26
Admitting: PHYSICIAN ASSISTANT

## 2020-08-26 ENCOUNTER — HOSPITAL ENCOUNTER (OUTPATIENT)
Dept: ULTRASOUND IMAGING | Facility: HOSPITAL | Age: 30
Discharge: HOME | End: 2020-08-26

## 2020-08-26 DIAGNOSIS — R07.2 PRECORDIAL PAIN: ICD-10-CM

## 2020-08-26 PROCEDURE — 76705 ECHO EXAM OF ABDOMEN: CPT

## 2020-08-26 PROCEDURE — 76705 ECHO EXAM OF ABDOMEN: CPT | Performed by: RADIOLOGY

## 2020-08-26 PROCEDURE — 93880 EXTRACRANIAL BILAT STUDY: CPT | Performed by: RADIOLOGY

## 2020-08-26 PROCEDURE — 93880 EXTRACRANIAL BILAT STUDY: CPT

## 2020-08-27 ENCOUNTER — TELEPHONE (OUTPATIENT)
Dept: CARDIOLOGY | Facility: CLINIC | Age: 30
End: 2020-08-27

## 2020-09-21 ENCOUNTER — TELEPHONE (OUTPATIENT)
Dept: CARDIOLOGY | Facility: CLINIC | Age: 30
End: 2020-09-21

## 2020-09-21 NOTE — TELEPHONE ENCOUNTER
Called pt to see when he was going to come by to get his monitor put on.   Pt answered but we lost connection.     Called pt back and he stated that he wants to hold off on the monitor for now due to him noticing that the only time he has his symptoms is when he eats greasy foods. He has cut back on that and has been doing better. I will take the order out of his chart for now.

## 2021-03-13 ENCOUNTER — HOSPITAL ENCOUNTER (OUTPATIENT)
Dept: HOSPITAL 79 - SLEEP-COR | Age: 31
End: 2021-03-13
Payer: COMMERCIAL

## 2021-03-13 DIAGNOSIS — G47.33: Primary | ICD-10-CM

## 2021-10-28 ENCOUNTER — HOSPITAL ENCOUNTER (EMERGENCY)
Facility: HOSPITAL | Age: 31
Discharge: HOME | End: 2021-10-28
Attending: STUDENT IN AN ORGANIZED HEALTH CARE EDUCATION/TRAINING PROGRAM | Admitting: STUDENT IN AN ORGANIZED HEALTH CARE EDUCATION/TRAINING PROGRAM

## 2021-10-28 ENCOUNTER — APPOINTMENT (OUTPATIENT)
Dept: GENERAL RADIOLOGY | Facility: HOSPITAL | Age: 31
End: 2021-10-28

## 2021-10-28 VITALS
RESPIRATION RATE: 18 BRPM | HEIGHT: 66 IN | HEART RATE: 78 BPM | DIASTOLIC BLOOD PRESSURE: 87 MMHG | BODY MASS INDEX: 38.57 KG/M2 | WEIGHT: 240 LBS | TEMPERATURE: 98.3 F | OXYGEN SATURATION: 98 % | SYSTOLIC BLOOD PRESSURE: 124 MMHG

## 2021-10-28 DIAGNOSIS — M25.562 ACUTE PAIN OF LEFT KNEE: Primary | ICD-10-CM

## 2021-10-28 PROCEDURE — 0 MORPHINE SULFATE (PF) 2 MG/ML SOLUTION: Performed by: STUDENT IN AN ORGANIZED HEALTH CARE EDUCATION/TRAINING PROGRAM

## 2021-10-28 PROCEDURE — 73562 X-RAY EXAM OF KNEE 3: CPT

## 2021-10-28 PROCEDURE — 99283 EMERGENCY DEPT VISIT LOW MDM: CPT

## 2021-10-28 PROCEDURE — 96372 THER/PROPH/DIAG INJ SC/IM: CPT

## 2021-10-28 PROCEDURE — 73562 X-RAY EXAM OF KNEE 3: CPT | Performed by: RADIOLOGY

## 2021-10-28 RX ORDER — MORPHINE SULFATE 2 MG/ML
4 INJECTION, SOLUTION INTRAMUSCULAR; INTRAVENOUS ONCE
Status: COMPLETED | OUTPATIENT
Start: 2021-10-28 | End: 2021-10-28

## 2021-10-28 RX ORDER — MORPHINE SULFATE 2 MG/ML
4 INJECTION, SOLUTION INTRAMUSCULAR; INTRAVENOUS ONCE
Status: DISCONTINUED | OUTPATIENT
Start: 2021-10-28 | End: 2021-10-28

## 2021-10-28 RX ORDER — ACETAMINOPHEN 500 MG
1000 TABLET ORAL ONCE
Status: COMPLETED | OUTPATIENT
Start: 2021-10-28 | End: 2021-10-28

## 2021-10-28 RX ORDER — IBUPROFEN 800 MG/1
800 TABLET ORAL EVERY 6 HOURS PRN
Qty: 30 TABLET | Refills: 0 | OUTPATIENT
Start: 2021-10-28 | End: 2022-02-01

## 2021-10-28 RX ADMIN — MORPHINE SULFATE 4 MG: 2 INJECTION, SOLUTION INTRAMUSCULAR; INTRAVENOUS at 21:46

## 2021-10-28 RX ADMIN — ACETAMINOPHEN 1000 MG: 500 TABLET ORAL at 21:20

## 2022-02-01 ENCOUNTER — APPOINTMENT (OUTPATIENT)
Dept: GENERAL RADIOLOGY | Facility: HOSPITAL | Age: 32
End: 2022-02-01

## 2022-02-01 ENCOUNTER — HOSPITAL ENCOUNTER (EMERGENCY)
Facility: HOSPITAL | Age: 32
Discharge: HOME | End: 2022-02-01
Attending: EMERGENCY MEDICINE | Admitting: EMERGENCY MEDICINE

## 2022-02-01 VITALS
RESPIRATION RATE: 18 BRPM | OXYGEN SATURATION: 97 % | TEMPERATURE: 98 F | DIASTOLIC BLOOD PRESSURE: 96 MMHG | HEIGHT: 66 IN | SYSTOLIC BLOOD PRESSURE: 141 MMHG | HEART RATE: 92 BPM | BODY MASS INDEX: 40.18 KG/M2 | WEIGHT: 250 LBS

## 2022-02-01 DIAGNOSIS — M25.562 ACUTE PAIN OF LEFT KNEE: Primary | ICD-10-CM

## 2022-02-01 PROCEDURE — 73562 X-RAY EXAM OF KNEE 3: CPT

## 2022-02-01 PROCEDURE — 99283 EMERGENCY DEPT VISIT LOW MDM: CPT

## 2022-02-01 RX ORDER — TIZANIDINE 4 MG/1
4 TABLET ORAL EVERY 8 HOURS PRN
Qty: 30 TABLET | Refills: 0 | Status: SHIPPED | OUTPATIENT
Start: 2022-02-01

## 2022-02-01 RX ORDER — DICLOFENAC SODIUM 75 MG/1
75 TABLET, DELAYED RELEASE ORAL 2 TIMES DAILY
Qty: 30 TABLET | Refills: 0 | Status: SHIPPED | OUTPATIENT
Start: 2022-02-01

## 2022-02-01 RX ORDER — HYDROCODONE BITARTRATE AND ACETAMINOPHEN 5; 325 MG/1; MG/1
1 TABLET ORAL ONCE
Status: COMPLETED | OUTPATIENT
Start: 2022-02-01 | End: 2022-02-01

## 2022-02-01 RX ADMIN — HYDROCODONE BITARTRATE AND ACETAMINOPHEN 1 TABLET: 5; 325 TABLET ORAL at 02:51

## 2022-02-01 NOTE — ED PROVIDER NOTES
"Subjective     History provided by:  Patient  Knee Pain  Location:  Knee  Time since incident:  1 day  Injury: yes    Mechanism of injury comment:  \"twisted\"  Knee location:  L knee  Pain details:     Quality:  Aching    Radiates to:  Does not radiate    Severity:  Moderate    Onset quality:  Sudden    Duration:  1 day    Timing:  Constant    Progression:  Worsening  Chronicity:  Recurrent (injured about one month prior)  Dislocation: no    Foreign body present:  No foreign bodies  Relieved by:  Nothing  Worsened by:  Bearing weight  Associated symptoms: no fever        Review of Systems   Constitutional: Negative.  Negative for fever.   HENT: Negative.    Respiratory: Negative.    Cardiovascular: Negative.  Negative for chest pain.   Gastrointestinal: Negative.  Negative for abdominal pain.   Endocrine: Negative.    Genitourinary: Negative.  Negative for dysuria.   Musculoskeletal: Positive for arthralgias and gait problem.   Skin: Negative.    Psychiatric/Behavioral: Negative.    All other systems reviewed and are negative.      Past Medical History:   Diagnosis Date   • Anxiety    • Hypertension        Allergies   Allergen Reactions   • Aspirin Swelling       Past Surgical History:   Procedure Laterality Date   • CARDIAC CATHETERIZATION N/A 2020    Procedure: Left Heart Cath;  Surgeon: Kevin Petersen MD;  Location: [facility] CATH INVASIVE LOC[facility];  Service: Cardiology;  Laterality: N/A;       Family History   Problem Relation Age of Onset   • Heart disease Father    • Heart disease Paternal Grandmother        Social History     Socioeconomic History   • Marital status: Single   Tobacco Use   • Smoking status: Former Smoker     Packs/day: 1.50     Types: Cigarettes     Quit date:      Years since quittin.0   • Smokeless tobacco: Current User     Types: Snuff   Substance and Sexual Activity   • Alcohol use: No   • Drug use: Defer   • Sexual activity: Defer           Objective   Physical Exam  Vitals " and nursing note reviewed.   Constitutional:       General: He is not in acute distress.     Appearance: He is well-developed. He is not diaphoretic.   HENT:      Head: Normocephalic and atraumatic.      Right Ear: External ear normal.      Left Ear: External ear normal.      Nose: Nose normal.   Eyes:      Conjunctiva/sclera: Conjunctivae normal.   Neck:      Vascular: No JVD.      Trachea: No tracheal deviation.   Cardiovascular:      Rate and Rhythm: Normal rate.      Heart sounds: No murmur heard.      Pulmonary:      Effort: Pulmonary effort is normal. No respiratory distress.      Breath sounds: No wheezing.   Abdominal:      Palpations: Abdomen is soft.      Tenderness: There is no abdominal tenderness.   Musculoskeletal:         General: Swelling, tenderness and signs of injury present. No deformity.      Cervical back: Normal range of motion and neck supple.      Comments: Localized tenderness to the left knee   Skin:     General: Skin is warm and dry.      Coloration: Skin is not pale.      Findings: No erythema or rash.   Neurological:      Mental Status: He is alert and oriented to person, place, and time.      Cranial Nerves: No cranial nerve deficit.   Psychiatric:         Behavior: Behavior normal.         Thought Content: Thought content normal.         Procedures           ED Course  ED Course as of 02/01/22 0228   Tue Feb 01, 2022   0116 XR knee left rad interpreted:  Negative left knee.    [RB]      ED Course User Index  [RB] Theodore Draper II, PA                                                 MDM  Number of Diagnoses or Management Options  Acute pain of left knee: new and requires workup     Amount and/or Complexity of Data Reviewed  Tests in the radiology section of CPT®: ordered and reviewed    Risk of Complications, Morbidity, and/or Mortality  Presenting problems: low  Diagnostic procedures: low  Management options: low    Patient Progress  Patient progress: stable      Final diagnoses:    Acute pain of left knee       ED Disposition  ED Disposition     ED Disposition Condition Comment    Discharge Stable           Page, John Gillespie MD  [address] [place] [address] [phone]    Schedule an appointment as soon as possible for a visit            Medication List      New Prescriptions    diclofenac 75 MG EC tablet  Commonly known as: VOLTAREN  Take 1 tablet by mouth 2 (Two) Times a Day.     tiZANidine 4 MG tablet  Commonly known as: ZANAFLEX  Take 1 tablet by mouth Every 8 (Eight) Hours As Needed (pain).        Stop    ibuprofen 800 MG tablet  Commonly known as: ADVIL,MOTRIN           Where to Get Your Medications      You can get these medications from any pharmacy    Bring a paper prescription for each of these medications  · diclofenac 75 MG EC tablet  · tiZANidine 4 MG tablet          Theodore Draper II, PA  02/01/22 0227

## 2022-02-01 NOTE — ED NOTES
MEDICAL SCREENING:    Reason for Visit: knee injury    Patient initially seen in triage.  The patient was advised further evaluation and diagnostic testing will be needed, some of the treatment and testing will be initiated in the lobby in order to begin the process.  The patient will be returned to the waiting area for the time being and possibly be re-assessed by a subsequent ED provider.  The patient will be brought back to the treatment area in as timely manner as possible.       Theodore Draper II, PA  02/01/22 0043

## 2022-03-04 ENCOUNTER — PRE-ADMISSION TESTING (OUTPATIENT)
Dept: PREADMISSION TESTING | Facility: HOSPITAL | Age: 32
End: 2022-03-04

## 2022-03-04 ENCOUNTER — LAB (OUTPATIENT)
Dept: LAB | Facility: HOSPITAL | Age: 32
End: 2022-03-04

## 2022-03-04 ENCOUNTER — TRANSCRIBE ORDERS (OUTPATIENT)
Dept: ADMINISTRATIVE | Facility: HOSPITAL | Age: 32
End: 2022-03-04

## 2022-03-04 ENCOUNTER — ANESTHESIA EVENT (OUTPATIENT)
Dept: PERIOP | Facility: HOSPITAL | Age: 32
End: 2022-03-04

## 2022-03-04 DIAGNOSIS — Z01.818 PRE-OPERATIVE CLEARANCE: Primary | ICD-10-CM

## 2022-03-04 DIAGNOSIS — Z01.818 PRE-OPERATIVE CLEARANCE: ICD-10-CM

## 2022-03-04 LAB
ANION GAP SERPL CALCULATED.3IONS-SCNC: 12.8 MMOL/L (ref 5–15)
BUN SERPL-MCNC: 13 MG/DL (ref 6–20)
BUN/CREAT SERPL: 16 (ref 7–25)
CALCIUM SPEC-SCNC: 9.7 MG/DL (ref 8.6–10.5)
CHLORIDE SERPL-SCNC: 101 MMOL/L (ref 98–107)
CO2 SERPL-SCNC: 26.2 MMOL/L (ref 22–29)
CREAT SERPL-MCNC: 0.81 MG/DL (ref 0.76–1.27)
DEPRECATED RDW RBC AUTO: 39.9 FL (ref 37–54)
EGFRCR SERPLBLD CKD-EPI 2021: 119.4 ML/MIN/1.73
ERYTHROCYTE [DISTWIDTH] IN BLOOD BY AUTOMATED COUNT: 13.2 % (ref 12.3–15.4)
GLUCOSE SERPL-MCNC: 110 MG/DL (ref 65–99)
HCT VFR BLD AUTO: 46.2 % (ref 37.5–51)
HGB BLD-MCNC: 15.2 G/DL (ref 13–17.7)
MCH RBC QN AUTO: 27.3 PG (ref 26.6–33)
MCHC RBC AUTO-ENTMCNC: 32.9 G/DL (ref 31.5–35.7)
MCV RBC AUTO: 82.9 FL (ref 79–97)
PLATELET # BLD AUTO: 238 10*3/MM3 (ref 140–450)
PMV BLD AUTO: 9.9 FL (ref 6–12)
POTASSIUM SERPL-SCNC: 3.9 MMOL/L (ref 3.5–5.2)
RBC # BLD AUTO: 5.57 10*6/MM3 (ref 4.14–5.8)
SODIUM SERPL-SCNC: 140 MMOL/L (ref 136–145)
WBC NRBC COR # BLD: 9.89 10*3/MM3 (ref 3.4–10.8)

## 2022-03-04 PROCEDURE — 36415 COLL VENOUS BLD VENIPUNCTURE: CPT

## 2022-03-04 PROCEDURE — 85027 COMPLETE CBC AUTOMATED: CPT

## 2022-03-04 PROCEDURE — U0004 COV-19 TEST NON-CDC HGH THRU: HCPCS | Performed by: GENERAL PRACTICE

## 2022-03-04 PROCEDURE — 80048 BASIC METABOLIC PNL TOTAL CA: CPT

## 2022-03-04 PROCEDURE — C9803 HOPD COVID-19 SPEC COLLECT: HCPCS

## 2022-03-04 NOTE — DISCHARGE INSTRUCTIONS
TAKE the following medications the morning of surgery:  3/7/2022  Arrival time per MD 0830 am per Dr. Perez office.    All heart or blood pressure medications    Please discontinue all blood thinners and anticoagulants (except aspirin) prior to surgery as per your surgeon and cardiologist instructions.  Aspirin may be continued up to the day prior to surgery.    HOLD all diabetic medications the morning of surgery as order by physician.    Please follow instructions on use of prep cloths provided by nurse. Return instruction sheet to pre-op nurse on day of surgery.    A RESPONSIBLE PERSON MUST REMAIN IN THE WAITING ROOM DURING YOUR PROCEDURE AND A RESPONSIBLE  MUST BE AVAILABLE UPON YOUR DISCHARGE.    General Instructions:  3/6/2022  • Do NOT eat or drink after midnight which includes water, mints, or gum.  • You may brush your teeth. Dental appliances that are removable must be taken out day of surgery.  • Do NOT smoke, chew tobacco, or drink alcohol within 24 hours prior to surgery.  • Bring medications in original bottles, any inhalers and if applicable your C-PAP/BI-PAP machine  • Bring any papers given to you in the doctor’s office  • Wear clean, comfortable clothes and socks  • Do NOT wear contact lenses or make-up or dark nail polish.  Bring a case for your glasses if applicable.  • Bring crutches or walker if applicable  • Leave all other valuables and jewelry at home  • If you were given a blood bank armband, continue to wear it until discharged.    Preventing a Surgical Site Infection:  • Shower the night before surgery (unless instructed otherwise) using a fresh bar of anti-bacterial soap (such as Dial) and clean washcloth.  Dry with a clean towel and dress in clean clothing.  • For 2 to 3 days before surgery, avoid shaving with a razor near where you will have surgery because the razor can irritate skin and make it easier to develop an infection.  Ask your surgeon if you will be receiving  antibiotics prior to surgery.  • Make sure you, your family, and all healthcare providers clean their hands with soap and water or an alcohol-based hand  before caring for you or your wound.  • If at all possible, quit smoking as many days before surgery as you can.    Day of Surgery:  Upon arrival, a pre-op nurse and anesthesiologist will review your health history, obtain vital signs, and answer questions you may have.  The only belongings needed at this time will be your home medications and if applicable you C-PAP/BI-PAP machine.  If you are staying overnight, your family can leave the rest of your belongings in the car and bring them to your room later.  A pre-op nurse will start an IV and you may receive medication in preparation for surgery.  Due to patient privacy and limited space, only one member of your family will be able to accompany you in the pre-op area.  While you are in surgery your family should notify the waiting room  if they leave the waiting room area and provide a contact number.  Please be aware that surgery does come with discomfort.  We want to make every effort to control your discomfort so please discuss any uncontrolled symptoms with your nurse.  Your doctor will most likely have prescribed pain medications.  If you are going home after surgery you will receive individualized written care instructions before being discharged.  A responsible adult must drive you to and from the hospital on the day of surgery and stay with you for 24 hours.  If you are staying overnight following surgery, you will be transported to your hospital room following the recovery period.

## 2022-03-05 LAB — SARS-COV-2 RNA PNL SPEC NAA+PROBE: NOT DETECTED

## 2022-03-07 ENCOUNTER — APPOINTMENT (OUTPATIENT)
Dept: GENERAL RADIOLOGY | Facility: HOSPITAL | Age: 32
End: 2022-03-07

## 2022-03-07 ENCOUNTER — HOSPITAL ENCOUNTER (OUTPATIENT)
Facility: HOSPITAL | Age: 32
Setting detail: HOSPITAL OUTPATIENT SURGERY
Discharge: HOME | End: 2022-03-07
Attending: GENERAL PRACTICE | Admitting: GENERAL PRACTICE

## 2022-03-07 ENCOUNTER — ANESTHESIA (OUTPATIENT)
Dept: PERIOP | Facility: HOSPITAL | Age: 32
End: 2022-03-07

## 2022-03-07 VITALS
SYSTOLIC BLOOD PRESSURE: 122 MMHG | WEIGHT: 225.2 LBS | DIASTOLIC BLOOD PRESSURE: 80 MMHG | TEMPERATURE: 98.7 F | RESPIRATION RATE: 13 BRPM | BODY MASS INDEX: 36.19 KG/M2 | OXYGEN SATURATION: 98 % | HEART RATE: 80 BPM | HEIGHT: 66 IN

## 2022-03-07 DIAGNOSIS — Z98.890 H/O ARTHROSCOPIC KNEE SURGERY: Primary | ICD-10-CM

## 2022-03-07 PROCEDURE — 0 CEFAZOLIN SODIUM-DEXTROSE 2-3 GM-%(50ML) RECONSTITUTED SOLUTION: Performed by: GENERAL PRACTICE

## 2022-03-07 PROCEDURE — 25010000002 MIDAZOLAM PER 1 MG: Performed by: NURSE ANESTHETIST, CERTIFIED REGISTERED

## 2022-03-07 PROCEDURE — C1713 ANCHOR/SCREW BN/BN,TIS/BN: HCPCS | Performed by: GENERAL PRACTICE

## 2022-03-07 PROCEDURE — 25010000002 FENTANYL CITRATE (PF) 50 MCG/ML SOLUTION: Performed by: NURSE ANESTHETIST, CERTIFIED REGISTERED

## 2022-03-07 PROCEDURE — 25010000002 PROPOFOL 10 MG/ML EMULSION: Performed by: NURSE ANESTHETIST, CERTIFIED REGISTERED

## 2022-03-07 PROCEDURE — 0 MEPERIDINE PER 100 MG: Performed by: NURSE ANESTHETIST, CERTIFIED REGISTERED

## 2022-03-07 PROCEDURE — L1830 KO IMMOB CANVAS LONG PRE OTS: HCPCS | Performed by: GENERAL PRACTICE

## 2022-03-07 PROCEDURE — 25010000002 ONDANSETRON PER 1 MG: Performed by: NURSE ANESTHETIST, CERTIFIED REGISTERED

## 2022-03-07 DEVICE — IMPLANTABLE DEVICE
Type: IMPLANTABLE DEVICE | Site: KNEE | Status: FUNCTIONAL
Brand: FIBERSTITCH™ IMPLANT, CURVED WITH TWO POLYESTER IMPLANTS AND 2-0 FIBERWIRE® SUTU

## 2022-03-07 DEVICE — IMPLANTABLE DEVICE
Type: IMPLANTABLE DEVICE | Site: KNEE | Status: FUNCTIONAL
Brand: FIBERSTITCH™ IMPLANT, STRAIGHT WITH TWO POLYESTER IMPLANTS AND 2-0 FIBERWIRE® SU

## 2022-03-07 RX ORDER — ONDANSETRON 2 MG/ML
4 INJECTION INTRAMUSCULAR; INTRAVENOUS AS NEEDED
Status: DISCONTINUED | OUTPATIENT
Start: 2022-03-07 | End: 2022-03-07 | Stop reason: HOSPADM

## 2022-03-07 RX ORDER — LIDOCAINE HYDROCHLORIDE 20 MG/ML
INJECTION, SOLUTION EPIDURAL; INFILTRATION; INTRACAUDAL; PERINEURAL AS NEEDED
Status: DISCONTINUED | OUTPATIENT
Start: 2022-03-07 | End: 2022-03-07 | Stop reason: SURG

## 2022-03-07 RX ORDER — MEPERIDINE HYDROCHLORIDE 25 MG/ML
12.5 INJECTION INTRAMUSCULAR; INTRAVENOUS; SUBCUTANEOUS
Status: DISCONTINUED | OUTPATIENT
Start: 2022-03-07 | End: 2022-03-07 | Stop reason: HOSPADM

## 2022-03-07 RX ORDER — FAMOTIDINE 10 MG/ML
INJECTION, SOLUTION INTRAVENOUS AS NEEDED
Status: DISCONTINUED | OUTPATIENT
Start: 2022-03-07 | End: 2022-03-07 | Stop reason: SURG

## 2022-03-07 RX ORDER — CELECOXIB 200 MG/1
200 CAPSULE ORAL DAILY
Qty: 14 CAPSULE | Refills: 0 | Status: SHIPPED | OUTPATIENT
Start: 2022-03-07

## 2022-03-07 RX ORDER — PROPOFOL 10 MG/ML
VIAL (ML) INTRAVENOUS AS NEEDED
Status: DISCONTINUED | OUTPATIENT
Start: 2022-03-07 | End: 2022-03-07 | Stop reason: SURG

## 2022-03-07 RX ORDER — MIDAZOLAM HYDROCHLORIDE 1 MG/ML
1 INJECTION INTRAMUSCULAR; INTRAVENOUS
Status: DISCONTINUED | OUTPATIENT
Start: 2022-03-07 | End: 2022-03-07 | Stop reason: HOSPADM

## 2022-03-07 RX ORDER — MIDAZOLAM HYDROCHLORIDE 1 MG/ML
INJECTION INTRAMUSCULAR; INTRAVENOUS AS NEEDED
Status: DISCONTINUED | OUTPATIENT
Start: 2022-03-07 | End: 2022-03-07 | Stop reason: SURG

## 2022-03-07 RX ORDER — MAGNESIUM HYDROXIDE 1200 MG/15ML
LIQUID ORAL AS NEEDED
Status: DISCONTINUED | OUTPATIENT
Start: 2022-03-07 | End: 2022-03-07 | Stop reason: HOSPADM

## 2022-03-07 RX ORDER — SODIUM CHLORIDE, SODIUM LACTATE, POTASSIUM CHLORIDE, CALCIUM CHLORIDE 600; 310; 30; 20 MG/100ML; MG/100ML; MG/100ML; MG/100ML
INJECTION, SOLUTION INTRAVENOUS CONTINUOUS PRN
Status: DISCONTINUED | OUTPATIENT
Start: 2022-03-07 | End: 2022-03-07 | Stop reason: SURG

## 2022-03-07 RX ORDER — DROPERIDOL 2.5 MG/ML
0.62 INJECTION, SOLUTION INTRAMUSCULAR; INTRAVENOUS ONCE AS NEEDED
Status: DISCONTINUED | OUTPATIENT
Start: 2022-03-07 | End: 2022-03-07 | Stop reason: HOSPADM

## 2022-03-07 RX ORDER — IPRATROPIUM BROMIDE AND ALBUTEROL SULFATE 2.5; .5 MG/3ML; MG/3ML
3 SOLUTION RESPIRATORY (INHALATION) ONCE AS NEEDED
Status: DISCONTINUED | OUTPATIENT
Start: 2022-03-07 | End: 2022-03-07 | Stop reason: HOSPADM

## 2022-03-07 RX ORDER — GABAPENTIN 300 MG/1
300 CAPSULE ORAL
Qty: 14 CAPSULE | Refills: 0 | Status: SHIPPED | OUTPATIENT
Start: 2022-03-07

## 2022-03-07 RX ORDER — SODIUM CHLORIDE, SODIUM LACTATE, POTASSIUM CHLORIDE, CALCIUM CHLORIDE 600; 310; 30; 20 MG/100ML; MG/100ML; MG/100ML; MG/100ML
125 INJECTION, SOLUTION INTRAVENOUS ONCE
Status: COMPLETED | OUTPATIENT
Start: 2022-03-07 | End: 2022-03-07

## 2022-03-07 RX ORDER — FENTANYL CITRATE 50 UG/ML
50 INJECTION, SOLUTION INTRAMUSCULAR; INTRAVENOUS
Status: DISCONTINUED | OUTPATIENT
Start: 2022-03-07 | End: 2022-03-07 | Stop reason: HOSPADM

## 2022-03-07 RX ORDER — SODIUM CHLORIDE 0.9 % (FLUSH) 0.9 %
10 SYRINGE (ML) INJECTION AS NEEDED
Status: DISCONTINUED | OUTPATIENT
Start: 2022-03-07 | End: 2022-03-07 | Stop reason: HOSPADM

## 2022-03-07 RX ORDER — CEFAZOLIN SODIUM 2 G/50ML
2 SOLUTION INTRAVENOUS ONCE
Status: COMPLETED | OUTPATIENT
Start: 2022-03-07 | End: 2022-03-07

## 2022-03-07 RX ORDER — BUPIVACAINE HYDROCHLORIDE AND EPINEPHRINE 5; 5 MG/ML; UG/ML
INJECTION, SOLUTION EPIDURAL; INTRACAUDAL; PERINEURAL AS NEEDED
Status: DISCONTINUED | OUTPATIENT
Start: 2022-03-07 | End: 2022-03-07 | Stop reason: HOSPADM

## 2022-03-07 RX ORDER — SODIUM CHLORIDE, SODIUM LACTATE, POTASSIUM CHLORIDE, CALCIUM CHLORIDE 600; 310; 30; 20 MG/100ML; MG/100ML; MG/100ML; MG/100ML
100 INJECTION, SOLUTION INTRAVENOUS ONCE AS NEEDED
Status: DISCONTINUED | OUTPATIENT
Start: 2022-03-07 | End: 2022-03-07 | Stop reason: HOSPADM

## 2022-03-07 RX ORDER — OXYCODONE HYDROCHLORIDE AND ACETAMINOPHEN 5; 325 MG/1; MG/1
1 TABLET ORAL ONCE AS NEEDED
Status: COMPLETED | OUTPATIENT
Start: 2022-03-07 | End: 2022-03-07

## 2022-03-07 RX ORDER — FENTANYL CITRATE 50 UG/ML
INJECTION, SOLUTION INTRAMUSCULAR; INTRAVENOUS AS NEEDED
Status: DISCONTINUED | OUTPATIENT
Start: 2022-03-07 | End: 2022-03-07 | Stop reason: SURG

## 2022-03-07 RX ORDER — LIDOCAINE HYDROCHLORIDE 10 MG/ML
INJECTION, SOLUTION EPIDURAL; INFILTRATION; INTRACAUDAL; PERINEURAL AS NEEDED
Status: DISCONTINUED | OUTPATIENT
Start: 2022-03-07 | End: 2022-03-07 | Stop reason: HOSPADM

## 2022-03-07 RX ORDER — OXYCODONE HYDROCHLORIDE 5 MG/1
5 TABLET ORAL EVERY 6 HOURS PRN
Qty: 30 TABLET | Refills: 0 | Status: SHIPPED | OUTPATIENT
Start: 2022-03-07

## 2022-03-07 RX ORDER — ONDANSETRON 2 MG/ML
INJECTION INTRAMUSCULAR; INTRAVENOUS AS NEEDED
Status: DISCONTINUED | OUTPATIENT
Start: 2022-03-07 | End: 2022-03-07 | Stop reason: SURG

## 2022-03-07 RX ORDER — SODIUM CHLORIDE 0.9 % (FLUSH) 0.9 %
10 SYRINGE (ML) INJECTION EVERY 12 HOURS SCHEDULED
Status: DISCONTINUED | OUTPATIENT
Start: 2022-03-07 | End: 2022-03-07 | Stop reason: HOSPADM

## 2022-03-07 RX ORDER — ONDANSETRON 4 MG/1
4 TABLET, FILM COATED ORAL EVERY 8 HOURS PRN
Qty: 20 TABLET | Refills: 0 | Status: SHIPPED | OUTPATIENT
Start: 2022-03-07

## 2022-03-07 RX ADMIN — FENTANYL CITRATE 100 MCG: 50 INJECTION INTRAMUSCULAR; INTRAVENOUS at 09:50

## 2022-03-07 RX ADMIN — OXYCODONE HYDROCHLORIDE AND ACETAMINOPHEN 1 TABLET: 5; 325 TABLET ORAL at 11:13

## 2022-03-07 RX ADMIN — FENTANYL CITRATE 50 MCG: 50 INJECTION INTRAMUSCULAR; INTRAVENOUS at 10:54

## 2022-03-07 RX ADMIN — SODIUM CHLORIDE, POTASSIUM CHLORIDE, SODIUM LACTATE AND CALCIUM CHLORIDE: 600; 310; 30; 20 INJECTION, SOLUTION INTRAVENOUS at 10:06

## 2022-03-07 RX ADMIN — LIDOCAINE HYDROCHLORIDE 100 MG: 20 INJECTION, SOLUTION EPIDURAL; INFILTRATION; INTRACAUDAL; PERINEURAL at 09:31

## 2022-03-07 RX ADMIN — FENTANYL CITRATE 50 MCG: 50 INJECTION INTRAMUSCULAR; INTRAVENOUS at 10:47

## 2022-03-07 RX ADMIN — FENTANYL CITRATE 100 MCG: 50 INJECTION INTRAMUSCULAR; INTRAVENOUS at 09:27

## 2022-03-07 RX ADMIN — ONDANSETRON 4 MG: 2 INJECTION INTRAMUSCULAR; INTRAVENOUS at 09:27

## 2022-03-07 RX ADMIN — MIDAZOLAM 2 MG: 1 INJECTION INTRAMUSCULAR; INTRAVENOUS at 09:27

## 2022-03-07 RX ADMIN — MEPERIDINE HYDROCHLORIDE 12.5 MG: 25 INJECTION INTRAMUSCULAR; INTRAVENOUS; SUBCUTANEOUS at 10:55

## 2022-03-07 RX ADMIN — CEFAZOLIN SODIUM 2 G: 2 SOLUTION INTRAVENOUS at 09:27

## 2022-03-07 RX ADMIN — FAMOTIDINE 20 MG: 10 INJECTION INTRAVENOUS at 09:27

## 2022-03-07 RX ADMIN — SODIUM CHLORIDE, POTASSIUM CHLORIDE, SODIUM LACTATE AND CALCIUM CHLORIDE 125 ML/HR: 600; 310; 30; 20 INJECTION, SOLUTION INTRAVENOUS at 08:06

## 2022-03-07 RX ADMIN — PROPOFOL 200 MG: 10 INJECTION, EMULSION INTRAVENOUS at 09:31

## 2022-03-07 RX ADMIN — SODIUM CHLORIDE, POTASSIUM CHLORIDE, SODIUM LACTATE AND CALCIUM CHLORIDE: 600; 310; 30; 20 INJECTION, SOLUTION INTRAVENOUS at 09:27

## 2022-03-07 NOTE — ANESTHESIA POSTPROCEDURE EVALUATION
Patient: Don Salamanca    Procedure Summary     Date: 03/07/22 Room / Location: [facility] OR 03 /  [facility] OR    Anesthesia Start: 0927 Anesthesia Stop: 1025    Procedure: LEFT KNEE ARTHROSCOPIC  MENISCAL REPAIR (Left Knee) Diagnosis: (S83.282A)    Surgeons: Markel Greco MD Provider: Robb Chu DO    Anesthesia Type: general ASA Status: 2          Anesthesia Type: general    Vitals  Vitals Value Taken Time   /76 03/07/22 1026   Temp 98.7 °F (37.1 °C) 03/07/22 1026   Pulse 61 03/07/22 1026   Resp 13 03/07/22 1026   SpO2 97 % 03/07/22 1026           Post Anesthesia Care and Evaluation    Patient location during evaluation: PACU  Patient participation: complete - patient participated  Level of consciousness: responsive to verbal stimuli  Pain score: 0  Pain management: adequate  Airway patency: patent  Anesthetic complications: No anesthetic complications  PONV Status: none  Cardiovascular status: hemodynamically stable  Respiratory status: nasal cannula  Hydration status: acceptable

## 2022-03-07 NOTE — EXTERNAL PATIENT INSTRUCTIONS
Patient Education   Table of Contents       Meniscus Tear Surgery, Care After     To view videos and all your education online visit,   [email or web address]   or scan this QR code with your smartphone.                  Meniscus Tear Surgery, Care After     This sheet gives you information about how to care for yourself after your procedure. Your health care provider may also give you more specific instructions. If you have problems or questions, contact your health care provider.   What can I expect after the procedure?    After your procedure, it is common to have:       Pain.       Swelling.       Stiffness.     Follow these instructions at home:   Medicines         Take over-the-counter and prescription medicines only as told by your health care provider.      Ask your health care provider if the medicine prescribed to you:       Requires you to avoid driving or using machinery.      Can cause constipation. You may need to take these actions to prevent or treat constipation:       Drink enough fluid to keep your urine pale yellow.       Take over-the-counter or prescription medicines.       Eat foods that are high in fiber, such as beans, whole grains, and fresh fruits and vegetables.       Limit foods that are high in fat and processed sugars, such as fried or sweet foods.         If you have a brace:         Wear the brace as told by your health care provider. Remove it only as told by your health care provider.       Check the skin around it every day. Tell your health care provider about any concerns.       Loosen it if your toes tingle, become numb, or turn cold and blue.       Keep it clean.      If the brace is not waterproof:      Do not  let it get wet.       Cover it with a watertight covering when you take a bath or shower.     Incision care           Follow instructions from your health care provider about how to take care of your incisions. Make sure you:       Wash your hands with soap  and water for at least 20 seconds before and after you change your bandage (dressing). If soap and water are not available, use hand .       Change your dressing as told by your health care provider.       Leave stitches (sutures), skin glue, or adhesive strips in place. These skin closures may need to stay in place for 2 weeks or longer. If adhesive strip edges start to loosen and curl up, you may trim the loose edges. Do not  remove adhesive strips completely unless your health care provider tells you to do that.      Check your incision areas every day for signs of infection. Check for:       Redness, swelling, or pain.       Fluid or blood.       Warmth.       Pus or a bad smell.      Do not  take baths, swim, or use a hot tub until your health care provider approves. Ask your health care provider if you may take showers.     Managing pain, stiffness, and swelling           If directed, put ice on the affected area.       If you have a removable brace, remove it as told by your health care provider.       Put ice in a plastic bag.       Place a towel between your skin and the bag.       Leave the ice on for 20 minutes, 2?3 times a day.       Remove the ice if your skin turns bright red. This is very important. If you cannot feel pain, heat, or cold, you have a greater risk of damage to the area.       Move your foot and toes often to reduce stiffness and swelling.       Raise (elevate) your knee above the level of your heart while you are sitting or lying down.       Driving         If you were given a sedative during the procedure, it can affect you for several hours. Do not  drive or operate machinery until your health care provider says that it is safe.       Ask your health care provider when it is safe to drive if you have a brace on your knee.     Activity         Rest as told by your health care provider.       Avoid sitting for a long time without moving. Get up to take short walks every 1?2  hours. This is important to improve blood flow and breathing. Ask for help if you feel weak or unsteady.      Do not  use the injured limb to support your body weight until your health care provider says that you can. Use crutches or a walker as told by your health care provider. This is important.      Do not  lift anything that is heavier than 10 lb (4.5 kg), or the limit that you are told, until your health care provider says that it is safe.       Return to your normal activities as told by your health care provider. Ask your health care provider what activities are safe for you.       Do exercises as told by your health care provider.     General instructions        Do not  use any products that contain nicotine or tobacco, such as cigarettes, e-cigarettes, and chewing tobacco. These can delay healing after surgery. If you need help quitting, ask your health care provider.       Keep all follow-up visits. This is important.       Contact a health care provider if:         You have a fever.       You have redness, swelling, or pain around an incision.       You have fluid or blood coming from an incision.       An incision feels warm to the touch.       You have pus or a bad smell coming from an incision.       You have severe pain that is not relieved with medicine.     Get help right away if:         You have chest pain.       You have shortness of breath or difficulty breathing.     These symptoms may represent a serious problem that is an emergency. Do not wait to see if the symptoms will go away. Get medical help right away. Call your local emergency services (911 in the [place] Do not drive yourself to the hospital.   Summary         After your procedure, it is common to have pain, swelling, and stiffness.       Take over-the-counter and prescription medicines only as told by your health care provider.      Do not  use the injured limb to support your body weight until your health care provider says that  you can. Use crutches or a walker as told by your health care provider. This is important.       Contact a health care provider if you have severe pain, a fever, or any signs of infection around an incision.     This information is not intended to replace advice given to you by your health care provider. Make sure you discuss any questions you have with your health care provider.     Document Released: 07/19/2006Document Revised: 05/17/2021Document Reviewed: 05/17/2021     [employer] Patient Education ? 2021 [employer]

## 2022-03-07 NOTE — EXTERNAL PATIENT INSTRUCTIONS
Patient Education   Table of Contents       Knee Arthroscopy     To view videos and all your education online visit,   [email or web address]   or scan this QR code with your smartphone.                  Knee Arthroscopy     Knee arthroscopy is a surgery to examine the inside of the knee joint and repair any damage to cartilage, surfaces, and other soft tissues around the joint. You may have this surgery if nonsurgical treatment has not relieved your symptoms. Knee arthroscopy may be used to:       Repair a torn ligament or other torn tissues. Ligaments are tissues that connect bones to each other.       Remove bone fragments.       Remove a fluid-filled sac (cyst).       Treat kneecap (patella)problems.       Treat septic knee. This is an advanced infection in the knee.     Arthroscopic surgery is done using a thin tube that has a light and camera on the end of it (arthroscope). The arthroscope is placed through a small incision, and the camera sends images to a screen in the operating room. The images are used to help perform the surgery.   Tell a health care provider about:         Any allergies you have.       All medicines you are taking, including vitamins, herbs, eye drops, creams, and over-the-counter medicines.       Any problems you or family members have had with anesthetic medicines.       Any blood disorders you have.       Any surgeries you have had.       Any medical conditions you have.       Whether you are pregnant or may be pregnant.     What are the risks?    Generally, this is a safe procedure. However, problems may occur, including:       Infection.       Bleeding.       Allergic reactions to medicines.       Damage to blood vessels, nerves, or tissues in the knee.       A blood clot that forms in the leg and travels to the lung (pulmonary embolism).       Failure of the surgery to relieve symptoms.       Knee stiffness.     What happens before the procedure?   Staying hydrated        Follow instructions from your health care provider about hydration, which may include:       Up to 2 hours before the procedure - you may continue to drink clear liquids, such as water, clear fruit juice, black coffee, and plain tea.     Eating and drinking restrictions    Follow instructions from your health care provider about eating and drinking, which may include:       8 hours before the procedure - stop eating heavy meals or foods, such as meat, fried foods, or fatty foods.       6 hours before the procedure - stop eating light meals or foods, such as toast or cereal.       6 hours before the procedure - stop drinking milk or drinks that contain milk.       2 hours before the procedure - stop drinking clear liquids.     Medicines    Ask your health care provider about:       Changing or stopping your regular medicines. This is especially important if you are taking diabetes medicines or blood thinners.       Taking medicines such as aspirin and ibuprofen. These medicines can thin your blood. Do not  take these medicines unless your health care provider tells you to take them.       Taking over-the-counter medicines, vitamins, herbs, and supplements.     General instructions         You may have a physical exam and tests, such as an X-ray, CT scan, or MRI.      Do not  drink alcohol unless your health care provider says that you can.      Do not  use any products that contain nicotine or tobacco for at least 4 weeks before the procedure. These products include cigarettes, e-cigarettes, and chewing tobacco. If you need help quitting, ask your health care provider.       Plan to have a responsible adult take you home from the hospital or clinic.       If you will be going home right after the procedure, plan to have a responsible adult care for you for the time you are told. This is important.      Ask your health care provider:       How your surgery site will be marked.      What steps will be taken to help  prevent infection. These steps may include:       Removing hair at the surgery site.       Washing skin with a germ-killing soap.       Taking antibiotic medicine.           What happens during the procedure?            An IV will be inserted into one of your veins.      You will be given one or more of the following:       A medicine to help you relax (sedative).       A medicine to numb the knee area (local anesthetic).       A medicine to make you fall asleep (general anesthetic).       A medicine that is injected into an area of your body to numb everything below the injection site (regional anesthetic). This may be injected into your groin or thigh.       A cuff may be placed around your upper leg to slow blood flow to your lower leg during the procedure.       Several small incisions will be made around your knee.       Your knee joint will be rinsed (flushed) and filled with sterile saline. This is a germ-free solution made of salt and water. This expands the area to help your surgeon see your joint more clearly.       An arthroscope will be passed through one of your incisions, into your knee joint.       Other surgical instruments will be passed through the other incisions. Then, your surgeon will examine and repair your knee as needed.       The sterile saline will be drained from your knee, and the cuff will be removed from your upper leg.       Your incisions will be closed with adhesive strips or stitches, also called sutures, and covered with a bandage (dressing).     The procedure may vary among health care providers and hospitals.     What happens after the procedure?            Your blood pressure, heart rate, breathing rate, and blood oxygen level will be monitored until you leave the hospital or clinic.       You will be given pain medicine as needed.       You may be given medicine to lower your risk of blood clots.       You may have to wear compression stockings. These stockings help to prevent  blood clots and reduce swelling in your legs.       You may be given a knee brace or immobilizer.      Do not  drive or use machinery until your health care provider approves.       Summary         Knee arthroscopy is a surgery to examine or repair the inside of your knee joint.       Before the procedure, follow instructions from your health care provider about eating and drinking.       Plan to have a responsible adult take you home from the hospital or clinic.     This information is not intended to replace advice given to you by your health care provider. Make sure you discuss any questions you have with your health care provider.     Document Released: 12/15/2001Document Revised: 04/19/2021Document Reviewed: 04/19/2021     [employer] Patient Education ? 2021 [employer] Inc.

## 2022-03-07 NOTE — EXTERNAL PATIENT INSTRUCTIONS
Patient Education   Table of Contents       Ondansetron tablets     To view videos and all your education online visit,   [email or web address]   or scan this QR code with your smartphone.                  Ondansetron tablets     What is this medicine?   ONDANSETRON (on DAN se seb) is used to treat nausea and vomiting caused by chemotherapy. It is also used to prevent or treat nausea and vomiting after surgery.   This medicine may be used for other purposes; ask your health care provider or pharmacist if you have questions.   COMMON BRAND NAME(S): Zofran   What should I tell my health care provider before I take this medicine?   They need to know if you have any of these conditions:         heart disease       history of irregular heartbeat       liver disease       low levels of magnesium or potassium in the blood       an unusual or allergic reaction to ondansetron, granisetron, other medicines, foods, dyes, or preservatives       pregnant or trying to get pregnant       breast-feeding     How should I use this medicine?   Take this medicine by mouth with a glass of water. Follow the directions on your prescription label. Take your doses at regular intervals. Do not take your medicine more often than directed.   Talk to your pediatrician regarding the use of this medicine in children. Special care may be needed.   Overdosage: If you think you have taken too much of this medicine contact a poison control center or emergency room at once.   NOTE: This medicine is only for you. Do not share this medicine with others.   What if I miss a dose?   If you miss a dose, take it as soon as you can. If it is almost time for your next dose, take only that dose. Do not take double or extra doses.   What may interact with this medicine?   Do not take this medicine with any of the following medications:         apomorphine       certain medicines for fungal infections like fluconazole, itraconazole, ketoconazole,  posaconazole, voriconazole       cisapride       dronedarone       pimozide       thioridazine     This medicine may also interact with the following medications:         carbamazepine       certain medicines for depression, anxiety, or psychotic disturbances       fentanyl       linezolid       MAOIs like Carbex, Eldepryl, Marplan, Nardil, and Parnate       methylene blue (injected into a vein)       other medicines that prolong the QT interval (cause an abnormal heart rhythm) like dofetilide, ziprasidone       phenytoin       rifampicin       tramadol     This list may not describe all possible interactions. Give your health care provider a list of all the medicines, herbs, non-prescription drugs, or dietary supplements you use. Also tell them if you smoke, drink alcohol, or use illegal drugs. Some items may interact with your medicine.   What should I watch for while using this medicine?   Check with your doctor or health care professional right away if you have any sign of an allergic reaction.   What side effects may I notice from receiving this medicine?   Side effects that you should report to your doctor or health care professional as soon as possible:         allergic reactions like skin rash, itching or hives, swelling of the face, lips or tongue       breathing problems       confusion       dizziness       fast or irregular heartbeat       feeling faint or lightheaded, falls       fever and chills       loss of balance or coordination       seizures       sweating       swelling of the hands or feet       tightness in the chest       tremors       unusually weak or tired     Side effects that usually do not require medical attention (report to your doctor or health care professional if they continue or are bothersome):         constipation or diarrhea       headache     This list may not describe all possible side effects. Call your doctor for medical advice about side effects. You may report side  effects to FDA at 0-920-FDA-0126.   Where should I keep my medicine?   Keep out of the reach of children.   Store between 2 and 30 degrees C (36 and 86 degrees F). Throw away any unused medicine after the expiration date.   NOTE: This sheet is a summary. It may not cover all possible information. If you have questions about this medicine, talk to your doctor, pharmacist, or health care provider.     ? 2021 Elsevier/[employer] (2019-12-10 07:16:43)

## 2022-03-07 NOTE — OP NOTE
Don Salamanca  3/7/2022      Operative Note:    Surgeon: ETIENNE Greco MD    Assistant: MARINA Sagastume    Pre-Operative Diagnosis:   Left knee lateral meniscus tear, displaced bucket    Post-Operative Diagnosis:   Left knee lateral meniscus tear, displaced bucket  No ACL/PCL/medial meniscus tear  No chondromalacia, no loose body    Procedure(s):    1.  Left knee arthroscopic lateral meniscal repair, CPT code 28806    Anesthesia: General with local block    Estimated Blood Loss: 0 cc    Specimen Obtained:  None    Complication(s):  None apparent.     Implants: Arthrex all inside meniscal repair x5, 1 explanted    Operative Indication:     Main King is a 31-year-old male with a left knee injury and a locked knee.  An MRI was consistent with a lateral meniscus displaced tear which he was advised of the risk benefits alternatives and complications to the procedure stated above he elected to proceed with surgery.    Arthroscopic Findings:    No chondral injury was noted.  No loose body noted.  No synovitis noted.  ACL PCL and medial meniscus were intact.  A lateral meniscus tear was noted from the posterior horn to just anterior of the mid body.  This was displaced in the intercondylar notch.  The anterior and posterior horns were intact.  There was fraying of the lateral meniscus anteriorly.    Operative Details:    The patient was met in the pre operative suite where the correct operative site was marked. The patient was brought to the operating room where anesthesia was initiated. The patient was positioned appropriately with all bony prominences well padded. The patient was prepped and draped in the usual sterile fashion and prior to incision a timeout was observed to verify the correct operative site, procedure and antibiotics.    Anterior lateral portal was created and the arthroscope was introduced into the knee and a thorough diagnostic arthroscopy was performed with findings noted above.    An anterior medial  portal was created.  An anterior fat pad was excised with a shaver for better visualization.  A combination of a probe and a blunt trocar was utilized to reduce the meniscus.  18-gauge spinal needle as well as a shaver was utilized to debride and try phonated the peripheral capsule.  The tear was torn in the red red zone on the periphery.  A shaver was utilized to debride the surface of the lateral plateau.    A straight all inside suture device was utilized to place a horizontal mattress suture just lateral to the posterior horn of the lateral meniscus.  This reduced the meniscus within the joint.  Attention was then brought more anteriorly where a combination of vertical and horizontal mattress sutures from posterior to anterior in the mid body was placed.    A shaver was utilized to debride the anterior horn where there is fraying noted.  There was no tore meniscus on the anterior horn or the anterior to mid body with probing.    All instruments were removed from the knee.  Local was injected within the knee for postoperative pain control.  Portal sites were closed with nylon suture.  A soft dressing was placed.  The patient was placed in a knee immobilizer locked in extension.  Patient was extubated, transferred to hospital bed in the PACU in stable condition.  The patient tolerated procedure well without any complication.    Post-operative Plan:    Discharge to home today  Dressing-  remove dressing in 3 to 4 days  Weight Bear/Lifting Status-nonweightbearing left lower extremity  DVT PPx-aspirin 81 mg twice daily for 14 days  Follow up-2 weeks in the office    Electronically Signed by: Markel Greco MD

## 2022-03-07 NOTE — EXTERNAL PATIENT INSTRUCTIONS
Patient Education   Table of Contents       Oxycodone Capsules or Tablets     To view videos and all your education online visit,   [email or web address]   or scan this QR code with your smartphone.                  Oxycodone Capsules or Tablets     What is this medicine?   OXYCODONE (ox i KOE done) is a pain reliever, also called an opioid. It treats severe pain.   This medicine may be used for other purposes; ask your health care provider or pharmacist if you have questions.   COMMON BRAND NAME(S): Dazidox, Endocodone, Oxaydo, OXECTA, OxyIR, Percolone, Roxicodone, Roxybond   What should I tell my health care provider before I take this medicine?   They need to know if you have any of these conditions:         brain tumor       drug abuse or addiction       head injury       heart disease       if you often drink alcohol       kidney disease       liver disease       low adrenal gland function       lung disease, asthma, or breathing problem       seizures       stomach or intestine problems       taken an MAOI such as Marplan, Nardil, or Parnate in the last 14 days       an unusual or allergic reaction to oxycodone, other drugs, foods, dyes, or preservatives       pregnant or trying to get pregnant       breast-feeding     How should I use this medicine?   Take this medicine by mouth with water. Take it as directed on the prescription label at the same time every day. You can take it with or without food. If it upsets your stomach, take it with food. Keep taking it unless your health care provider tells you to stop.   Some brands of this medicine, like Oxaydo, have special instructions. Ask your doctor or pharmacist if these directions are for you: Do not cut, crush or chew this medicine. Do not wet, soak, or lick the tablet before you take it.   A special MedGuide will be given to you by the pharmacist with each prescription and refill. Be sure to read this information carefully each time.   Talk to  your pediatrician regarding the use of this medicine in children. Special care may be needed.   Overdosage: If you think you have taken too much of this medicine contact a poison control center or emergency room at once.   NOTE: This medicine is only for you. Do not share this medicine with others.   What if I miss a dose?   If you miss a dose, take it as soon as you can. If it is almost time for your next dose, take only that dose. Do not take double or extra doses.   What may interact with this medicine?   Do not take this medicine with any of the following medications:         safinamide     This medicine may interact with the following medications:         alcohol       antihistamines for allergy, cough, and cold       atropine       certain antivirals for HIV or hepatitis       certain antibiotics like clarithromycin, erythromycin, linezolid, rifampin       certain medicines for anxiety or sleep       certain medicines for bladder problems like oxybutynin, tolterodine       certain medicines for depression like amitriptyline, fluoxetine, sertraline       certain medicines for fungal infections like ketoconazole, itraconazole, posaconazole       certain medicines for migraine headache like almotriptan, eletriptan, frovatriptan, naratriptan, rizatriptan, sumatriptan, zolmitriptan       certain medicines for nausea or vomiting like dolasetron, granisetron, ondansetron, palonosetron       certain medicines for Parkinson's disease like benztropine, trihexyphenidyl       certain medicines for seizures like carbamazepine, phenobarbital, phenytoin, primidone       certain medicines for stomach problems like dicyclomine, hyoscyamine       certain medicines for travel sickness like scopolamine       diuretics       general anesthetics like halothane, isoflurane, methoxyflurane, propofol       ipratropium       MAOIs like Marplan, Nardil, and Parnate       medicines that relax muscles       methylene blue       other  narcotic medicines for pain or cough       phenothiazines like chlorpromazine, mesoridazine, prochlorperazine, thioridazine     This list may not describe all possible interactions. Give your health care provider a list of all the medicines, herbs, non-prescription drugs, or dietary supplements you use. Also tell them if you smoke, drink alcohol, or use illegal drugs. Some items may interact with your medicine.   What should I watch for while using this medicine?   Tell your health care provider if your pain does not go away, if it gets worse, or if you have new or a different type of pain. You may develop tolerance to this medicine. Tolerance means that you will need a higher dose of the medicine for pain relief. Tolerance is normal and is expected if you take this medicine for a long time.   Do not suddenly stop taking your medicine because you may develop a severe reaction. Your body becomes used to the medicine. This does NOT mean you are addicted. Addiction is a behavior related to getting and using a medicine for a nonmedical reason. If you have pain, you have a medical reason to take pain medicine. Your health care provider will tell you how much medicine to take. If your health care provider wants you to stop the medicine, the dose will be slowly lowered over time to avoid any side effects.   If you take other medicines that also cause drowsiness such as other narcotic pain medicines, benzodiazepines, or other medicines for sleep, you may have more side effects. Give your health care provider a list of all medicines you use. He or she will tell you how much medicine to take. Do not take more medicine than directed. Get emergency help right away if you have trouble breathing or are unusually tired or sleepy.   Talk to your health care provider about naloxone and how to get it. Naloxone is an emergency medicine used for an opioid overdose. An overdose can happen if you take too much opioid. It can also happen  if an opioid is taken with some other medicines or substances, such as alcohol. Know the symptoms of an overdose, such as trouble breathing, unusually tired or sleepy, or not being able to respond or wake up. Make sure to tell caregivers and close contacts where it is stored. Make sure they know how to use it. After naloxone is given, you must get emergency help right away. Naloxone is a temporary treatment. Repeat doses may be needed.   You may get drowsy or dizzy. Do not drive, use machinery, or do anything that needs mental alertness until you know how this medicine affects you. Do not stand up or sit up quickly, especially if you are an older patient. This reduces the risk of dizzy or fainting spells. Alcohol may interfere with the effect of this medicine. Avoid alcoholic drinks.   This medicine will cause constipation. If you do not have a bowel movement for 3 days, call your health care provider.   Your mouth may get dry. Chewing sugarless gum or sucking hard candy and drinking plenty of water may help. Contact your health care provider if the problem does not go away or is severe.   What side effects may I notice from receiving this medicine?   Side effects that you should report to your doctor or health care professional as soon as possible:         allergic reactions (skin rash, itching or hives; swelling of the face, lips, or tongue)       confusion       kidney injury (trouble passing urine or change in the amount of urine)       low adrenal gland function (nausea; vomiting; loss of appetite; unusually weak or tired; dizziness; low blood pressure)       low blood pressure (dizziness; feeling faint or lightheaded, falls; unusually weak or tired)       serotonin syndrome (irritable; confusion; diarrhea; fast or irregular heartbeat; muscle twitching; stiff muscles; trouble walking; sweating; high fever; seizures; chills; vomiting)       trouble breathing     Side effects that usually do not require medical  attention (report to your doctor or health care professional if they continue or are bothersome):         constipation       dry mouth       nausea, vomiting       tiredness     This list may not describe all possible side effects. Call your doctor for medical advice about side effects. You may report side effects to FDA at 5-688-HRR-5018.   Where should I keep my medicine?   Keep out of the reach of children and pets. This medicine can be abused. Keep it in a safe place to protect it from theft. Do not share it with anyone. It is only for you. Selling or giving away this medicine is dangerous and against the law.   Store at 25 degrees C (77 degrees F). Protect from light and moisture. Keep the container tightly closed. Get rid of any unused medicine after the expiration date.   This medicine may cause harm and death if it is taken by other adults, children, or pets. It is important to get rid of the medicine as soon as you no longer need it or it is . You can do this in two ways:         Take the medicine to a medicine take-back program. Check with your pharmacy or law enforcement to find a location.       If you cannot return the medicine, flush it down the toilet.     NOTE: This sheet is a summary. It may not cover all possible information. If you have questions about this medicine, talk to your doctor, pharmacist, or health care provider.     ?  Elsevier/Gold Standard (2021 13:26:06)

## 2022-03-07 NOTE — ANESTHESIA PREPROCEDURE EVALUATION
Anesthesia Evaluation     Patient summary reviewed and Nursing notes reviewed   no history of anesthetic complications:  NPO Solid Status: > 8 hours  NPO Liquid Status: > 8 hours           Airway   Mallampati: II  TM distance: >3 FB  Neck ROM: full  Dental - normal exam     Pulmonary - negative pulmonary ROS    breath sounds clear to auscultation  Cardiovascular   Exercise tolerance: good (4-7 METS)    Rhythm: regular  Rate: normal    (-) angina      Neuro/Psych  (+) psychiatric history,    GI/Hepatic/Renal/Endo - negative ROS     Musculoskeletal (-) negative ROS    Abdominal     Abdomen: soft.   Substance History - negative use     OB/GYN          Other - negative ROS                       Anesthesia Plan    ASA 2     general     intravenous induction     Anesthetic plan, all risks, benefits, and alternatives have been provided, discussed and informed consent has been obtained with: patient.    Plan discussed with CRNA.        CODE STATUS:

## 2022-03-07 NOTE — BRIEF OP NOTE
KNEE ARTHROSCOPY WITH MENISCECTOMY  Progress Note    Don Salamanca  3/7/2022    Pre-op Diagnosis:   S83.282A       Post-Op Diagnosis Codes:  Left knee lateral meniscus tear, displaced bucket  No ACL/PCL/medial meniscus tear  No chondromalacia, no loose body    Procedure/CPT® Codes:  1.  Left knee arthroscopic lateral meniscal repair, CPT code 07294      Procedure(s):  LEFT KNEE ARTHROSCOPIC  MENISCAL REPAIR    Surgeon(s):  Markel Greco MD    Anesthesia: General    Staff:   Circulator: Melissa Alexander RN; Marjan Duarte RN  Scrub Person: Kym Haley  Assistant: Everett Sagastume  Assistant: Everett Sagastume      Estimated Blood Loss: none    Urine Voided: * No values recorded between 3/7/2022  9:29 AM and 3/7/2022 10:18 AM *    Specimens:                None          Drains: * No LDAs found *    Findings: See operative note    Complications: None apparent    Assistant: Everett Sagastume  was responsible for performing the following activities: Retraction, Suction, Irrigation, Suturing, Closing and Placing Dressing and their skilled assistance was necessary for the success of this case.    Markel Greco MD     Date: 3/7/2022  Time: 10:21 EST

## 2022-03-07 NOTE — EXTERNAL PATIENT INSTRUCTIONS
Patient Education   Table of Contents       Celecoxib Oral Capsules     To view videos and all your education online visit,   [email or web address]   or scan this QR code with your smartphone.                  Celecoxib Oral Capsules     What is this medicine?   CELECOXIB (sell a KOX ib) is a non-steroidal anti-inflammatory drug, also known as an NSAID. It is used to treat pain, inflammation, and swelling.   This medicine may be used for other purposes; ask your health care provider or pharmacist if you have questions.   COMMON BRAND NAME(S): Celebrex   What should I tell my health care provider before I take this medicine?   They need to know if you have any of these conditions:         bleeding disorders       coronary artery bypass graft (CABG) within the past 2 weeks       if you often drink alcohol       heart attack       heart disease       heart failure       high blood pressure       high levels of potassium in the blood       kidney disease       liver disease       low red blood cell counts       lung or breathing disease (asthma)       receiving steroids like dexamethasone or prednisone       smoke cigarettes       stomach bleeding       stomach or intestine problems       take drugs that treat or prevent blood clots       an unusual or allergic reaction to celecoxib, other medicines, foods, dyes, or preservatives       pregnant or trying to get pregnant       breast-feeding     How should I use this medicine?   Take this drug by mouth with water. Take it as directed on the prescription label at the same time every day. Do not cut, crush or chew this drug. Swallow the capsules whole. You may open the capsule and put the contents in 1 teaspoon of applesauce. Swallow the drug and applesauce right away. Do not chew the drug or applesauce. Keep taking it unless your health care provider tells you to stop.   A special MedGuide will be given to you by the pharmacist with each prescription and refill.  Be sure to read this information carefully each time.   Talk to your pediatrician regarding the use of this drug in children. While this drug may be prescribed for children as young as 2 years old for selected conditions, precautions do apply.   Patients over 65 years of age may have a stronger reaction and need a smaller dose.   Overdosage: If you think you have taken too much of this medicine contact a poison control center or emergency room at once.   NOTE: This medicine is only for you. Do not share this medicine with others.   What if I miss a dose?   If you miss a dose, take it as soon as you can. If it is almost time for your next dose, take only that dose. Do not take double or extra doses.   What may interact with this medicine?   Do not take this medicine with any of the following medications:         cidofovir       ketorolac       thioridazine     This medicine may also interact with the following medications:         alcohol       aspirin and aspirin-like medicines       atomoxetine       certain medicines for blood pressure, heart disease, irregular heart beat       certain medicines for depression, anxiety, or psychotic disorders       certain medicines that treat or prevent blood clots like warfarin, enoxaparin, dalteparin, apixaban, dabigatran, and rivaroxaban       cyclosporine       digoxin       diuretics       fluconazole       lithium       methotrexate       other NSAIDs, medicines for pain and inflammation, like ibuprofen or naproxen       pemetrexed       rifampin       steroid medicines like prednisone or cortisone     This list may not describe all possible interactions. Give your health care provider a list of all the medicines, herbs, non-prescription drugs, or dietary supplements you use. Also tell them if you smoke, drink alcohol, or use illegal drugs. Some items may interact with your medicine.   What should I watch for while using this medicine?   Visit your health care provider for  regular checks on your progress. Tell your health care provider if your symptoms do not start to get better or if they get worse.   Do not take other medicines that contain aspirin, ibuprofen, or naproxen with this medicine. Side effects such as stomach upset, nausea, or ulcers may be more likely to occur. Many non-prescription medicines contain aspirin, ibuprofen, or naproxen. Always read labels carefully.   This medicine can cause serious ulcers and bleeding in the stomach. It can happen with no warning. Smoking, drinking alcohol, older age, and poor health can also increase risks. Call your health care provider right away if you have stomach pain or blood in your vomit or stool.   This medicine does not prevent a heart attack or stroke. This medicine may increase the chance of a heart attack or stroke. The chance may increase the longer you use this medicine or if you have heart disease. If you take aspirin to prevent a heart attack or stroke, talk to your health care provider about using this medicine.   Alcohol may interfere with the effect of this medicine. Avoid alcoholic drinks.   This medicine may cause serious skin reactions. They can happen weeks to months after starting the medicine. Contact your health care provider right away if you notice fevers or flu-like symptoms with a rash. The rash may be red or purple and then turn into blisters or peeling of the skin. Or, you might notice a red rash with swelling of the face, lips or lymph nodes in your neck or under your arms.   Talk to your health care provider if you are pregnant before taking this medicine. Taking this medicine between weeks 20 and 30 of pregnancy may harm your unborn baby. Your health care provider will monitor you closely if you need to take it. After 30 weeks of pregnancy, do not take this medicine.   You may get drowsy or dizzy. Do not drive, use machinery, or do anything that needs mental alertness until you know how this medicine  affects you. Do not stand up or sit up quickly, especially if you are an older patient. This reduces the risk of dizzy or fainting spells.   Be careful brushing or flossing your teeth or using a toothpick because you may get an infection or bleed more easily. If you have any dental work done, tell your dentist you are receiving this medicine.   This medicine may make it more difficult to get pregnant. Talk to your health care provider if you are concerned about your fertility.   What side effects may I notice from receiving this medicine?   Side effects that you should report to your doctor or health care provider as soon as possible:         allergic reactions (skin rash, itching or hives; swelling of the face, lips, or tongue)       bleeding (bloody or black, tarry stools; red or dark brown urine; spitting up blood or brown material that looks like coffee grounds; red spots on the skin; unusual bruising or bleeding from the eyes, gums, or nose)       blood clot (chest pain; shortness of breath; pain, swelling, or warmth in the leg)       heart failure (trouble breathing; fast, irregular heartbeat; sudden weight gain; swelling of the ankles, feet, hands; unusually weak or tired)       kidney injury (trouble passing urine or change in the amount of urine)       light-colored stool       liver injury (dark yellow or brown urine; general ill feeling or flu-like symptoms; loss of appetite, right upper belly pain; unusually weak or tired, yellowing of the eyes or skin)       low red blood cell counts (trouble breathing; feeling faint; lightheaded, falls; unusually weak or tired)       rash, fever, and swollen lymph nodes       redness, blistering, peeling, or loosening of the skin, including inside the mouth       stroke (changes in vision; confusion; trouble speaking or understanding; severe headaches; sudden numbness or weakness of the face, arm or leg; trouble walking; dizziness; loss of balance or coordination)      Side effects that usually do not require medical attention (report to your doctor or health care provider if they continue or are bothersome):         constipation       diarrhea       dizziness       headache       nausea, vomiting       passing gas       trouble sleeping     This list may not describe all possible side effects. Call your doctor for medical advice about side effects. You may report side effects to FDA at 6-647-BEU-0056.   Where should I keep my medicine?   Keep out of the reach of children and pets.   Store at room temperature between 15 and 30 degrees C (59 and 86 degrees F).   Get rid of any unused medicine after the expiration date.   To get rid of medicines that are no longer needed or have :         Take the medicine to a medicine take-back program. Check with your pharmacy or law enforcement to find a location.       If you cannot return the medicine, check the label or package insert to see if the medicine should be thrown out in the garbage or flushed down the toilet. If you are not sure, ask your health care provider. If it is safe to put it in the trash, empty the medicine out of the container. Mix the medicine with cat litter, dirt, coffee grounds, or other unwanted substance. Seal the mixture in a bag or container. Put it in the trash.     NOTE: This sheet is a summary. It may not cover all possible information. If you have questions about this medicine, talk to your doctor, pharmacist, or health care provider.     ?  [employer]/Gold Standard (2021 16:12:42)

## 2022-03-07 NOTE — EXTERNAL PATIENT INSTRUCTIONS
"Patient Education   Table of Contents       General Anesthesia, Adult     To view videos and all your education online visit,   [email or web address]   or scan this QR code with your smartphone.                  General Anesthesia, Adult     General anesthesia is the use of medicines to make a person \"go to sleep\" (unconscious) for a medical procedure. General anesthesia must be used for certain procedures, and is often recommended for procedures that:       Last a long time.       Require you to be still or in an unusual position.       Are major and can cause blood loss.      The medicines used for general anesthesia are called general anesthetics. As well as making you unconscious for a certain amount of time, these medicines:       Prevent pain.       Control your blood pressure.       Relax your muscles.     Tell a health care provider about:         Any allergies you have.       All medicines you are taking, including vitamins, herbs, eye drops, creams, and over-the-counter medicines.       Any problems you or family members have had with anesthetic medicines.       Types of anesthetics you have had in the past.       Any blood disorders you have.       Any surgeries you have had.       Any medical conditions you have.       Any recent upper respiratory, chest, or ear infections.      Any history of:       Heart or lung conditions, such as heart failure, sleep apnea, asthma, or chronic obstructive pulmonary disease (COPD).        service.       Depression or anxiety.       Any tobacco or drug use, including marijuana or alcohol use.       Whether you are pregnant or may be pregnant.     What are the risks?    Generally, this is a safe procedure. However, problems may occur, including:       Allergic reaction.       Lung and heart problems.       Inhaling food or liquid from the stomach into the lungs (aspiration).       Nerve injury.       Dental injury.       Air in the bloodstream, which " can lead to stroke.       Extreme agitation or confusion (delirium) when you wake up from the anesthetic.       Waking up during your procedure and being unable to move. This is rare.     These problems are more likely to develop if you are having a major surgery or if you have an advanced or serious medical condition. You can prevent some of these complications by answering all of your health care provider's questions thoroughly and by following all instructions before your procedure.    General anesthesia can cause side effects, including:       Nausea or vomiting.       A sore throat from the breathing tube.       Hoarseness.       Wheezing or coughing.       Shaking chills.       Tiredness.       Body aches.       Anxiety.       Sleepiness or drowsiness.       Confusion or agitation.     What happens before the procedure?   Staying hydrated       Follow instructions from your health care provider about hydration, which may include:       Up to 2 hours before the procedure - you may continue to drink clear liquids, such as water, clear fruit juice, black coffee, and plain tea.     Eating and drinking restrictions    Follow instructions from your health care provider about eating and drinking, which may include:       8 hours before the procedure - stop eating heavy meals or foods such as meat, fried foods, or fatty foods.       6 hours before the procedure - stop eating light meals or foods, such as toast or cereal.       6 hours before the procedure - stop drinking milk or drinks that contain milk.       2 hours before the procedure - stop drinking clear liquids.     Medicines    Ask your health care provider about:       Changing or stopping your regular medicines. This is especially important if you are taking diabetes medicines or blood thinners.       Taking medicines such as aspirin and ibuprofen. These medicines can thin your blood. Do not  take these medicines unless your health care provider tells you to  take them.       Taking over-the-counter medicines, vitamins, herbs, and supplements. Do not  take these during the week before your procedure unless your health care provider approves them.     General instructions         Starting 3?6 weeks before the procedure, do not  use any products that contain nicotine or tobacco, such as cigarettes and e-cigarettes. If you need help quitting, ask your health care provider.       If you brush your teeth on the morning of the procedure, make sure to spit out all of the toothpaste.       Tell your health care provider if you become ill or develop a cold, cough, or fever.       If instructed by your health care provider, bring your sleep apnea device with you on the day of your surgery (if applicable).      Ask your health care provider if you will be going home the same day, the following day, or after a longer hospital stay.       Plan to have a responsible adult take you home from the hospital or clinic.       Plan to have a responsible adult care for you for the time you are told after you leave the hospital or clinic. This is important.       What happens during the procedure?           You will be given anesthetics through both of the following:       A mask placed over your nose and mouth.       An IV in one of your veins.       You may receive a medicine to help you relax (sedative).       After you are unconscious, a breathing tube may be inserted down your throat to help you breathe. This will be removed before you wake up.      An anesthesia specialist will stay with you throughout your procedure. He or she will:       Keep you comfortable and safe by continuing to give you medicines and adjusting the amount of medicine that you get.       Monitor your blood pressure, pulse, and oxygen levels to make sure that the anesthetics do not cause any problems.     The procedure may vary among health care providers and hospitals.     What happens after the procedure?          Your blood pressure, temperature, heart rate, breathing rate, and blood oxygen level will be monitored until the medicines you were given have worn off.       You will wake up in a recovery area. You may wake up slowly.       If you feel anxious or agitated, you may be given medicine to help you calm down.       If you will be going home the same day, your health care provider may check to make sure you can walk, drink, and urinate.       Your health care provider will treat any pain or side effects you have before you go home.      Do not  drive or operate machinery until your health care provider says that it is safe.     Summary         General anesthesia is used to keep you still and prevent pain during a procedure.       It is important to tell your health care provider about your medical history and any surgeries you have had, and previous experience with anesthesia.       Follow your health care provider's instructions about when to stop eating, drinking, or taking certain medicines before your procedure.       Plan to have a responsible adult take you home from the hospital or clinic.     This information is not intended to replace advice given to you by your health care provider. Make sure you discuss any questions you have with your health care provider.     Document Released: 03/26/2009Document Revised: 08/30/2021Document Reviewed: 03/31/2021     [employer] Patient Education ? 2021 [employer]

## 2022-03-07 NOTE — ANESTHESIA PROCEDURE NOTES
Airway  Urgency: elective    Date/Time: 3/7/2022 9:32 AM  Airway not difficult    General Information and Staff    Patient location during procedure: OR  Anesthesiologist: Robb Chu DO  CRNA: Hugh Ramsey CRNA    Indications and Patient Condition    Preoxygenated: yes  MILS not maintained throughout  Mask difficulty assessment: 0 - not attempted    Final Airway Details  Final airway type: supraglottic airway      Successful airway: unique  Size 4    Number of attempts at approach: 1  Assessment: lips, teeth, and gum same as pre-op and atraumatic intubation    Additional Comments  Atraumatic LMA placement, dentition unchanged.

## 2022-03-07 NOTE — EXTERNAL PATIENT INSTRUCTIONS
Patient Education   Table of Contents       Gabapentin Capsules or Tablets     To view videos and all your education online visit,   [email or web address]   or scan this QR code with your smartphone.                  Gabapentin Capsules or Tablets     What is this medicine?   GABAPENTIN (GA ba pen tin) is used to control seizures in certain types of epilepsy. It is also used to treat certain types of nerve pain.   This medicine may be used for other purposes; ask your health care provider or pharmacist if you have questions.   COMMON BRAND NAME(S): Active-PAC with Gabapentin, Gabarone, Gralise, Neurontin   What should I tell my health care provider before I take this medicine?   They need to know if you have any of these conditions:         history of drug abuse or alcohol abuse problem       kidney disease       lung or breathing disease       suicidal thoughts, plans, or attempt; a previous suicide attempt by you or a family member       an unusual or allergic reaction to gabapentin, other medicines, foods, dyes, or preservatives       pregnant or trying to get pregnant       breast-feeding     How should I use this medicine?   Take this medicine by mouth with a glass of water. Follow the directions on the prescription label. You can take it with or without food. If it upsets your stomach, take it with food. Take your medicine at regular intervals. Do not take it more often than directed. Do not stop taking except on your doctor's advice.   If you are directed to break the 600 or 800 mg tablets in half as part of your dose, the extra half tablet should be used for the next dose. If you have not used the extra half tablet within 28 days, it should be thrown away.   A special MedGuide will be given to you by the pharmacist with each prescription and refill. Be sure to read this information carefully each time.   Talk to your pediatrician regarding the use of this medicine in children. While this drug may  be prescribed for children as young as 3 years for selected conditions, precautions do apply.   Overdosage: If you think you have taken too much of this medicine contact a poison control center or emergency room at once.   NOTE: This medicine is only for you. Do not share this medicine with others.   What if I miss a dose?   If you miss a dose, take it as soon as you can. If it is almost time for your next dose, take only that dose. Do not take double or extra doses.   What may interact with this medicine?   This medicine may interact with the following medications:         alcohol       antihistamines for allergy, cough, and cold       certain medicines for anxiety or sleep       certain medicines for depression like amitriptyline, fluoxetine, sertraline       certain medicines for seizures like phenobarbital, primidone       certain medicines for stomach problems       general anesthetics like halothane, isoflurane, methoxyflurane, propofol       local anesthetics like lidocaine, pramoxine, tetracaine       medicines that relax muscles for surgery       narcotic medicines for pain       phenothiazines like chlorpromazine, mesoridazine, prochlorperazine, thioridazine     This list may not describe all possible interactions. Give your health care provider a list of all the medicines, herbs, non-prescription drugs, or dietary supplements you use. Also tell them if you smoke, drink alcohol, or use illegal drugs. Some items may interact with your medicine.   What should I watch for while using this medicine?   Visit your doctor or health care provider for regular checks on your progress. You may want to keep a record at home of how you feel your condition is responding to treatment. You may want to share this information with your doctor or health care provider at each visit. You should contact your doctor or health care provider if your seizures get worse or if you have any new types of seizures. Do not stop taking  this medicine or any of your seizure medicines unless instructed by your doctor or health care provider. Stopping your medicine suddenly can increase your seizures or their severity.   This medicine may cause serious skin reactions. They can happen weeks to months after starting the medicine. Contact your health care provider right away if you notice fevers or flu-like symptoms with a rash. The rash may be red or purple and then turn into blisters or peeling of the skin. Or, you might notice a red rash with swelling of the face, lips or lymph nodes in your neck or under your arms.   Wear a medical identification bracelet or chain if you are taking this medicine for seizures, and carry a card that lists all your medications.   You may get drowsy, dizzy, or have blurred vision. Do not drive, use machinery, or do anything that needs mental alertness until you know how this medicine affects you. To reduce dizzy or fainting spells, do not sit or stand up quickly, especially if you are an older patient. Alcohol can increase drowsiness and dizziness. Avoid alcoholic drinks.   Your mouth may get dry. Chewing sugarless gum or sucking hard candy, and drinking plenty of water will help.   The use of this medicine may increase the chance of suicidal thoughts or actions. Pay special attention to how you are responding while on this medicine. Any worsening of mood, or thoughts of suicide or dying should be reported to your health care provider right away.   Women who become pregnant while using this medicine may enroll in the [place] Antiepileptic Drug Pregnancy Registry by calling 1-180.165.9291. This registry collects information about the safety of antiepileptic drug use during pregnancy.   What side effects may I notice from receiving this medicine?   Side effects that you should report to your doctor or health care professional as soon as possible:         allergic reactions like skin rash, itching or hives, swelling  of the face, lips, or tongue       breathing problems       rash, fever, and swollen lymph nodes       redness, blistering, peeling or loosening of the skin, including inside the mouth       suicidal thoughts, mood changes     Side effects that usually do not require medical attention (report to your doctor or health care professional if they continue or are bothersome):         dizziness       drowsiness       headache       nausea, vomiting       swelling of ankles, feet, hands       tiredness     This list may not describe all possible side effects. Call your doctor for medical advice about side effects. You may report side effects to FDA at 4-972-FDA-8529.   Where should I keep my medicine?   Keep out of reach of children.   This medicine may cause accidental overdose and death if it taken by other adults, children, or pets. Mix any unused medicine with a substance like cat litter or coffee grounds. Then throw the medicine away in a sealed container like a sealed bag or a coffee can with a lid. Do not use the medicine after the expiration date.   Store at room temperature between 15 and 30 degrees C (59 and 86 degrees F).   NOTE: This sheet is a summary. It may not cover all possible information. If you have questions about this medicine, talk to your doctor, pharmacist, or health care provider.     ? 2021 [employer] (2021-11-03 12:16:18)

## 2022-07-31 ENCOUNTER — HOSPITAL ENCOUNTER (EMERGENCY)
Facility: HOSPITAL | Age: 32
Discharge: HOME | End: 2022-08-01
Attending: STUDENT IN AN ORGANIZED HEALTH CARE EDUCATION/TRAINING PROGRAM | Admitting: STUDENT IN AN ORGANIZED HEALTH CARE EDUCATION/TRAINING PROGRAM

## 2022-07-31 ENCOUNTER — APPOINTMENT (OUTPATIENT)
Dept: GENERAL RADIOLOGY | Facility: HOSPITAL | Age: 32
End: 2022-07-31

## 2022-07-31 DIAGNOSIS — R06.02 SHORTNESS OF BREATH: Primary | ICD-10-CM

## 2022-07-31 LAB
ALBUMIN SERPL-MCNC: 4.38 G/DL (ref 3.5–5.2)
ALBUMIN/GLOB SERPL: 1.5 G/DL
ALP SERPL-CCNC: 108 U/L (ref 39–117)
ALT SERPL W P-5'-P-CCNC: 30 U/L (ref 1–41)
ANION GAP SERPL CALCULATED.3IONS-SCNC: 11.6 MMOL/L (ref 5–15)
AST SERPL-CCNC: 21 U/L (ref 1–40)
BASOPHILS # BLD AUTO: 0.03 10*3/MM3 (ref 0–0.2)
BASOPHILS NFR BLD AUTO: 0.2 % (ref 0–1.5)
BILIRUB SERPL-MCNC: 0.3 MG/DL (ref 0–1.2)
BUN SERPL-MCNC: 13 MG/DL (ref 6–20)
BUN/CREAT SERPL: 16 (ref 7–25)
CALCIUM SPEC-SCNC: 9.4 MG/DL (ref 8.6–10.5)
CHLORIDE SERPL-SCNC: 104 MMOL/L (ref 98–107)
CO2 SERPL-SCNC: 24.4 MMOL/L (ref 22–29)
CREAT SERPL-MCNC: 0.81 MG/DL (ref 0.76–1.27)
DEPRECATED RDW RBC AUTO: 38.3 FL (ref 37–54)
EGFRCR SERPLBLD CKD-EPI 2021: 118.7 ML/MIN/1.73
EOSINOPHIL # BLD AUTO: 0.17 10*3/MM3 (ref 0–0.4)
EOSINOPHIL NFR BLD AUTO: 1.4 % (ref 0.3–6.2)
ERYTHROCYTE [DISTWIDTH] IN BLOOD BY AUTOMATED COUNT: 12.7 % (ref 12.3–15.4)
GLOBULIN UR ELPH-MCNC: 2.9 GM/DL
GLUCOSE SERPL-MCNC: 106 MG/DL (ref 65–99)
HCT VFR BLD AUTO: 45 % (ref 37.5–51)
HGB BLD-MCNC: 14.8 G/DL (ref 13–17.7)
IMM GRANULOCYTES # BLD AUTO: 0.05 10*3/MM3 (ref 0–0.05)
IMM GRANULOCYTES NFR BLD AUTO: 0.4 % (ref 0–0.5)
LYMPHOCYTES # BLD AUTO: 2.29 10*3/MM3 (ref 0.7–3.1)
LYMPHOCYTES NFR BLD AUTO: 18.8 % (ref 19.6–45.3)
MCH RBC QN AUTO: 27.6 PG (ref 26.6–33)
MCHC RBC AUTO-ENTMCNC: 32.9 G/DL (ref 31.5–35.7)
MCV RBC AUTO: 84 FL (ref 79–97)
MONOCYTES # BLD AUTO: 0.86 10*3/MM3 (ref 0.1–0.9)
MONOCYTES NFR BLD AUTO: 7 % (ref 5–12)
NEUTROPHILS NFR BLD AUTO: 72.2 % (ref 42.7–76)
NEUTROPHILS NFR BLD AUTO: 8.81 10*3/MM3 (ref 1.7–7)
NRBC BLD AUTO-RTO: 0 /100 WBC (ref 0–0.2)
NT-PROBNP SERPL-MCNC: 14.7 PG/ML (ref 0–450)
PLATELET # BLD AUTO: 228 10*3/MM3 (ref 140–450)
PMV BLD AUTO: 9.8 FL (ref 6–12)
POTASSIUM SERPL-SCNC: 3.8 MMOL/L (ref 3.5–5.2)
PROT SERPL-MCNC: 7.3 G/DL (ref 6–8.5)
RBC # BLD AUTO: 5.36 10*6/MM3 (ref 4.14–5.8)
SODIUM SERPL-SCNC: 140 MMOL/L (ref 136–145)
TROPONIN T SERPL-MCNC: <0.01 NG/ML (ref 0–0.03)
WBC NRBC COR # BLD: 12.21 10*3/MM3 (ref 3.4–10.8)

## 2022-07-31 PROCEDURE — 25010000002 DEXAMETHASONE PER 1 MG: Performed by: STUDENT IN AN ORGANIZED HEALTH CARE EDUCATION/TRAINING PROGRAM

## 2022-07-31 PROCEDURE — 96375 TX/PRO/DX INJ NEW DRUG ADDON: CPT

## 2022-07-31 PROCEDURE — 83880 ASSAY OF NATRIURETIC PEPTIDE: CPT | Performed by: STUDENT IN AN ORGANIZED HEALTH CARE EDUCATION/TRAINING PROGRAM

## 2022-07-31 PROCEDURE — 71045 X-RAY EXAM CHEST 1 VIEW: CPT

## 2022-07-31 PROCEDURE — 99283 EMERGENCY DEPT VISIT LOW MDM: CPT

## 2022-07-31 PROCEDURE — 85025 COMPLETE CBC W/AUTO DIFF WBC: CPT | Performed by: STUDENT IN AN ORGANIZED HEALTH CARE EDUCATION/TRAINING PROGRAM

## 2022-07-31 PROCEDURE — 84484 ASSAY OF TROPONIN QUANT: CPT | Performed by: STUDENT IN AN ORGANIZED HEALTH CARE EDUCATION/TRAINING PROGRAM

## 2022-07-31 PROCEDURE — 70360 X-RAY EXAM OF NECK: CPT

## 2022-07-31 PROCEDURE — 96374 THER/PROPH/DIAG INJ IV PUSH: CPT

## 2022-07-31 PROCEDURE — 80053 COMPREHEN METABOLIC PANEL: CPT | Performed by: STUDENT IN AN ORGANIZED HEALTH CARE EDUCATION/TRAINING PROGRAM

## 2022-07-31 RX ORDER — FAMOTIDINE 10 MG/ML
20 INJECTION, SOLUTION INTRAVENOUS ONCE
Status: COMPLETED | OUTPATIENT
Start: 2022-07-31 | End: 2022-07-31

## 2022-07-31 RX ORDER — DEXAMETHASONE SODIUM PHOSPHATE 4 MG/ML
8 INJECTION, SOLUTION INTRA-ARTICULAR; INTRALESIONAL; INTRAMUSCULAR; INTRAVENOUS; SOFT TISSUE ONCE
Status: COMPLETED | OUTPATIENT
Start: 2022-07-31 | End: 2022-07-31

## 2022-07-31 RX ORDER — SODIUM CHLORIDE 0.9 % (FLUSH) 0.9 %
10 SYRINGE (ML) INJECTION AS NEEDED
Status: DISCONTINUED | OUTPATIENT
Start: 2022-07-31 | End: 2022-08-01 | Stop reason: HOSPADM

## 2022-07-31 RX ADMIN — FAMOTIDINE 20 MG: 10 INJECTION, SOLUTION INTRAVENOUS at 23:36

## 2022-07-31 RX ADMIN — DEXAMETHASONE SODIUM PHOSPHATE 8 MG: 4 INJECTION, SOLUTION INTRA-ARTICULAR; INTRALESIONAL; INTRAMUSCULAR; INTRAVENOUS; SOFT TISSUE at 23:35

## 2022-07-31 RX ADMIN — SODIUM CHLORIDE 1000 ML: 9 INJECTION, SOLUTION INTRAVENOUS at 23:33

## 2022-08-01 ENCOUNTER — APPOINTMENT (OUTPATIENT)
Dept: GENERAL RADIOLOGY | Facility: HOSPITAL | Age: 32
End: 2022-08-01

## 2022-08-01 VITALS
OXYGEN SATURATION: 99 % | SYSTOLIC BLOOD PRESSURE: 110 MMHG | BODY MASS INDEX: 35.36 KG/M2 | WEIGHT: 220 LBS | RESPIRATION RATE: 20 BRPM | HEIGHT: 66 IN | HEART RATE: 70 BPM | DIASTOLIC BLOOD PRESSURE: 55 MMHG | TEMPERATURE: 98.5 F

## 2022-08-01 LAB
HOLD SPECIMEN: NORMAL
HOLD SPECIMEN: NORMAL
WHOLE BLOOD HOLD COAG: NORMAL
WHOLE BLOOD HOLD SPECIMEN: NORMAL

## 2022-08-01 PROCEDURE — 71045 X-RAY EXAM CHEST 1 VIEW: CPT

## 2022-08-01 NOTE — ED PROVIDER NOTES
Subjective   32-year-old male presents to the ER due to concerns for rash, itching, and shortness of breath with the sensation of his throat closing.  Patient noted symptom onset just prior to attempting to lay down to go to sleep.  Patient denied any new medications.  Denied new detergents.  Denied concerns for exposure to any type of chemical irritant.  Denied exposure to any type of irritative plant.  Patient noted he took an over-the-counter allergy pill that he was unable to name.  When patient arrived to the ER, he noted his rash improved and his symptoms have improved.  Afebrile.  No chest pain.  Vitals stable          Review of Systems   Respiratory: Positive for shortness of breath.    Skin: Positive for rash.   All other systems reviewed and are negative.      Past Medical History:   Diagnosis Date   • Anxiety        Allergies   Allergen Reactions   • Aspirin Swelling       Past Surgical History:   Procedure Laterality Date   • CARDIAC CATHETERIZATION N/A 2020    Procedure: Left Heart Cath;  Surgeon: Kevin Petersen MD;  Location: [facility] CATH INVASIVE LOCATION;  Service: Cardiology;  Laterality: N/A;   • KNEE ARTHROSCOPY W/ MENISCECTOMY Left 3/7/2022    Procedure: LEFT KNEE ARTHROSCOPIC  MENISCAL REPAIR;  Surgeon: Markel Greco MD;  Location: [facility] OR;  Service: Orthopedics;  Laterality: Left;       Family History   Problem Relation Age of Onset   • Heart disease Father    • Heart disease Paternal Grandmother        Social History     Socioeconomic History   • Marital status: Single   Tobacco Use   • Smoking status: Former Smoker     Packs/day: 1.50     Types: Cigarettes     Quit date: 2022     Years since quittin.4   • Smokeless tobacco: Former User     Types: Snuff   Vaping Use   • Vaping Use: Every day   • Substances: Nicotine, Flavoring   • Devices: Disposable   Substance and Sexual Activity   • Alcohol use: No   • Drug use: Never   • Sexual activity: Defer           Objective   Physical  Exam  Constitutional:       General: He is not in acute distress.     Appearance: He is well-developed. He is not ill-appearing.   HENT:      Head: Normocephalic and atraumatic.   Eyes:      Extraocular Movements: Extraocular movements intact.      Pupils: Pupils are equal, round, and reactive to light.   Neck:      Vascular: No JVD.   Cardiovascular:      Rate and Rhythm: Normal rate and regular rhythm.      Heart sounds: Normal heart sounds. No murmur heard.  Pulmonary:      Effort: No tachypnea, accessory muscle usage or respiratory distress.      Breath sounds: Normal breath sounds. No stridor. No decreased breath sounds, wheezing, rhonchi or rales.   Chest:      Chest wall: No deformity, tenderness or crepitus.   Abdominal:      General: Bowel sounds are normal.      Palpations: Abdomen is soft.      Tenderness: There is no abdominal tenderness. There is no guarding or rebound.   Musculoskeletal:         General: Normal range of motion.      Cervical back: Normal range of motion and neck supple.      Right lower leg: No tenderness. No edema.      Left lower leg: No tenderness. No edema.   Lymphadenopathy:      Cervical: No cervical adenopathy.   Skin:     General: Skin is warm and dry.      Coloration: Skin is not cyanotic.      Findings: No ecchymosis or erythema.   Neurological:      General: No focal deficit present.      Mental Status: He is alert and oriented to person, place, and time.      Cranial Nerves: No cranial nerve deficit.      Motor: No weakness.   Psychiatric:         Mood and Affect: Mood normal. Mood is not anxious.         Behavior: Behavior normal. Behavior is not agitated.         Procedures           ED Course  ED Course as of 08/01/22 0124   Mon Aug 01, 2022   0122 Patient received IV Decadron and IV Pepcid.  Patient also received IV fluids.  CBC notes slight leukocytosis.  CMP unremarkable.  Troponin and BNP within normal limits.  Chest x-ray unremarkable.  Soft tissue imaging of the  neck noted no acute abnormality.  Patient patient was monitored for an extended period time with no new or worsening numbness.  No urticarial rash was ever witnessed.  Work up and results were discussed throughly with the patient.  The patient will be discharged for further monitoring and management with their PCP.  Red flags, warning signs, worsening symptoms, and when to return to the ER discussed with and understood by the patient.  Patient will follow up with their PCP in a timely manner.  Vitals stable at discharge. [SF]      ED Course User Index  [SF] Ryan Reyna DO                                           [facility]    Final diagnoses:   Shortness of breath       ED Disposition  ED Disposition     ED Disposition   Discharge    Condition   Stable    Comment   --             No follow-up provider specified.       Medication List      No changes were made to your prescriptions during this visit.          Ryan Reyna DO  08/01/22 0124

## 2023-02-25 ENCOUNTER — APPOINTMENT (OUTPATIENT)
Dept: CT IMAGING | Facility: HOSPITAL | Age: 33
End: 2023-02-25
Payer: MEDICAID

## 2023-02-25 ENCOUNTER — HOSPITAL ENCOUNTER (EMERGENCY)
Facility: HOSPITAL | Age: 33
Discharge: HOME | End: 2023-02-25
Attending: STUDENT IN AN ORGANIZED HEALTH CARE EDUCATION/TRAINING PROGRAM | Admitting: EMERGENCY MEDICINE
Payer: MEDICAID

## 2023-02-25 ENCOUNTER — APPOINTMENT (OUTPATIENT)
Dept: GENERAL RADIOLOGY | Facility: HOSPITAL | Age: 33
End: 2023-02-25
Payer: MEDICAID

## 2023-02-25 VITALS
HEIGHT: 66 IN | OXYGEN SATURATION: 96 % | HEART RATE: 54 BPM | WEIGHT: 220 LBS | DIASTOLIC BLOOD PRESSURE: 70 MMHG | BODY MASS INDEX: 35.36 KG/M2 | RESPIRATION RATE: 21 BRPM | TEMPERATURE: 98.3 F | SYSTOLIC BLOOD PRESSURE: 119 MMHG

## 2023-02-25 DIAGNOSIS — R07.89 ATYPICAL CHEST PAIN: Primary | ICD-10-CM

## 2023-02-25 LAB
ALBUMIN SERPL-MCNC: 4.5 G/DL (ref 3.5–5.2)
ALBUMIN/GLOB SERPL: 1.2 G/DL
ALP SERPL-CCNC: 110 U/L (ref 39–117)
ALT SERPL W P-5'-P-CCNC: 72 U/L (ref 1–41)
AMPHET+METHAMPHET UR QL: POSITIVE
AMPHETAMINES UR QL: POSITIVE
ANION GAP SERPL CALCULATED.3IONS-SCNC: 11.5 MMOL/L (ref 5–15)
AST SERPL-CCNC: 90 U/L (ref 1–40)
BACTERIA UR QL AUTO: ABNORMAL /HPF
BARBITURATES UR QL SCN: NEGATIVE
BASOPHILS # BLD AUTO: 0.02 10*3/MM3 (ref 0–0.2)
BASOPHILS NFR BLD AUTO: 0.1 % (ref 0–1.5)
BENZODIAZ UR QL SCN: NEGATIVE
BILIRUB SERPL-MCNC: 0.8 MG/DL (ref 0–1.2)
BILIRUB UR QL STRIP: NEGATIVE
BUN SERPL-MCNC: 9 MG/DL (ref 6–20)
BUN/CREAT SERPL: 10.7 (ref 7–25)
BUPRENORPHINE SERPL-MCNC: NEGATIVE NG/ML
CALCIUM SPEC-SCNC: 10.7 MG/DL (ref 8.6–10.5)
CANNABINOIDS SERPL QL: NEGATIVE
CHLORIDE SERPL-SCNC: 103 MMOL/L (ref 98–107)
CLARITY UR: CLEAR
CO2 SERPL-SCNC: 27.5 MMOL/L (ref 22–29)
COCAINE UR QL: NEGATIVE
COLOR UR: ABNORMAL
CREAT SERPL-MCNC: 0.84 MG/DL (ref 0.76–1.27)
DEPRECATED RDW RBC AUTO: 37.7 FL (ref 37–54)
EGFRCR SERPLBLD CKD-EPI 2021: 117.4 ML/MIN/1.73
EOSINOPHIL # BLD AUTO: 0.11 10*3/MM3 (ref 0–0.4)
EOSINOPHIL NFR BLD AUTO: 0.8 % (ref 0.3–6.2)
ERYTHROCYTE [DISTWIDTH] IN BLOOD BY AUTOMATED COUNT: 12.5 % (ref 12.3–15.4)
ETHANOL BLD-MCNC: 14 MG/DL (ref 0–10)
ETHANOL UR QL: 0.01 %
GEN 5 2HR TROPONIN T REFLEX: <6 NG/L
GLOBULIN UR ELPH-MCNC: 3.7 GM/DL
GLUCOSE SERPL-MCNC: 202 MG/DL (ref 65–99)
GLUCOSE UR STRIP-MCNC: NEGATIVE MG/DL
HCT VFR BLD AUTO: 48.8 % (ref 37.5–51)
HGB BLD-MCNC: 16.1 G/DL (ref 13–17.7)
HGB UR QL STRIP.AUTO: NEGATIVE
HOLD SPECIMEN: NORMAL
HOLD SPECIMEN: NORMAL
HYALINE CASTS UR QL AUTO: ABNORMAL /LPF
IMM GRANULOCYTES # BLD AUTO: 0.07 10*3/MM3 (ref 0–0.05)
IMM GRANULOCYTES NFR BLD AUTO: 0.5 % (ref 0–0.5)
KETONES UR QL STRIP: ABNORMAL
LEUKOCYTE ESTERASE UR QL STRIP.AUTO: ABNORMAL
LIPASE SERPL-CCNC: 24 U/L (ref 13–60)
LYMPHOCYTES # BLD AUTO: 1.84 10*3/MM3 (ref 0.7–3.1)
LYMPHOCYTES NFR BLD AUTO: 12.7 % (ref 19.6–45.3)
MCH RBC QN AUTO: 27.3 PG (ref 26.6–33)
MCHC RBC AUTO-ENTMCNC: 33 G/DL (ref 31.5–35.7)
MCV RBC AUTO: 82.9 FL (ref 79–97)
METHADONE UR QL SCN: NEGATIVE
MONOCYTES # BLD AUTO: 0.63 10*3/MM3 (ref 0.1–0.9)
MONOCYTES NFR BLD AUTO: 4.4 % (ref 5–12)
NEUTROPHILS NFR BLD AUTO: 11.81 10*3/MM3 (ref 1.7–7)
NEUTROPHILS NFR BLD AUTO: 81.5 % (ref 42.7–76)
NITRITE UR QL STRIP: NEGATIVE
NRBC BLD AUTO-RTO: 0 /100 WBC (ref 0–0.2)
OPIATES UR QL: NEGATIVE
OXYCODONE UR QL SCN: NEGATIVE
PCP UR QL SCN: NEGATIVE
PH UR STRIP.AUTO: 8 [PH] (ref 5–8)
PLATELET # BLD AUTO: 235 10*3/MM3 (ref 140–450)
PMV BLD AUTO: 9.4 FL (ref 6–12)
POTASSIUM SERPL-SCNC: 4.9 MMOL/L (ref 3.5–5.2)
PROPOXYPH UR QL: NEGATIVE
PROT SERPL-MCNC: 8.2 G/DL (ref 6–8.5)
PROT UR QL STRIP: NEGATIVE
RBC # BLD AUTO: 5.89 10*6/MM3 (ref 4.14–5.8)
RBC # UR STRIP: ABNORMAL /HPF
REF LAB TEST METHOD: ABNORMAL
SODIUM SERPL-SCNC: 142 MMOL/L (ref 136–145)
SP GR UR STRIP: 1.02 (ref 1–1.03)
SQUAMOUS #/AREA URNS HPF: ABNORMAL /HPF
TRICYCLICS UR QL SCN: NEGATIVE
TROPONIN T DELTA: NORMAL
TROPONIN T SERPL HS-MCNC: <6 NG/L
UROBILINOGEN UR QL STRIP: ABNORMAL
WBC # UR STRIP: ABNORMAL /HPF
WBC NRBC COR # BLD: 14.48 10*3/MM3 (ref 3.4–10.8)
WHOLE BLOOD HOLD COAG: NORMAL
WHOLE BLOOD HOLD SPECIMEN: NORMAL

## 2023-02-25 PROCEDURE — 36415 COLL VENOUS BLD VENIPUNCTURE: CPT

## 2023-02-25 PROCEDURE — 96375 TX/PRO/DX INJ NEW DRUG ADDON: CPT

## 2023-02-25 PROCEDURE — 80053 COMPREHEN METABOLIC PANEL: CPT | Performed by: STUDENT IN AN ORGANIZED HEALTH CARE EDUCATION/TRAINING PROGRAM

## 2023-02-25 PROCEDURE — 80306 DRUG TEST PRSMV INSTRMNT: CPT | Performed by: STUDENT IN AN ORGANIZED HEALTH CARE EDUCATION/TRAINING PROGRAM

## 2023-02-25 PROCEDURE — 71045 X-RAY EXAM CHEST 1 VIEW: CPT

## 2023-02-25 PROCEDURE — 99284 EMERGENCY DEPT VISIT MOD MDM: CPT

## 2023-02-25 PROCEDURE — 81001 URINALYSIS AUTO W/SCOPE: CPT | Performed by: STUDENT IN AN ORGANIZED HEALTH CARE EDUCATION/TRAINING PROGRAM

## 2023-02-25 PROCEDURE — 83690 ASSAY OF LIPASE: CPT | Performed by: STUDENT IN AN ORGANIZED HEALTH CARE EDUCATION/TRAINING PROGRAM

## 2023-02-25 PROCEDURE — 96374 THER/PROPH/DIAG INJ IV PUSH: CPT

## 2023-02-25 PROCEDURE — 93010 ELECTROCARDIOGRAM REPORT: CPT | Performed by: INTERNAL MEDICINE

## 2023-02-25 PROCEDURE — 93005 ELECTROCARDIOGRAM TRACING: CPT | Performed by: STUDENT IN AN ORGANIZED HEALTH CARE EDUCATION/TRAINING PROGRAM

## 2023-02-25 PROCEDURE — 96376 TX/PRO/DX INJ SAME DRUG ADON: CPT

## 2023-02-25 PROCEDURE — 84484 ASSAY OF TROPONIN QUANT: CPT | Performed by: STUDENT IN AN ORGANIZED HEALTH CARE EDUCATION/TRAINING PROGRAM

## 2023-02-25 PROCEDURE — 82077 ASSAY SPEC XCP UR&BREATH IA: CPT | Performed by: STUDENT IN AN ORGANIZED HEALTH CARE EDUCATION/TRAINING PROGRAM

## 2023-02-25 PROCEDURE — 74176 CT ABD & PELVIS W/O CONTRAST: CPT

## 2023-02-25 PROCEDURE — 25010000002 ONDANSETRON PER 1 MG: Performed by: EMERGENCY MEDICINE

## 2023-02-25 PROCEDURE — 85025 COMPLETE CBC W/AUTO DIFF WBC: CPT | Performed by: STUDENT IN AN ORGANIZED HEALTH CARE EDUCATION/TRAINING PROGRAM

## 2023-02-25 PROCEDURE — 25010000002 ONDANSETRON PER 1 MG: Performed by: STUDENT IN AN ORGANIZED HEALTH CARE EDUCATION/TRAINING PROGRAM

## 2023-02-25 RX ORDER — PANTOPRAZOLE SODIUM 40 MG/10ML
40 INJECTION, POWDER, LYOPHILIZED, FOR SOLUTION INTRAVENOUS ONCE
Status: COMPLETED | OUTPATIENT
Start: 2023-02-25 | End: 2023-02-25

## 2023-02-25 RX ORDER — PHENOBARBITAL, HYOSCYAMINE SULFATE, ATROPINE SULFATE AND SCOPOLAMINE HYDROBROMIDE .0194; .1037; 16.2; .0065 MG/1; MG/1; MG/1; MG/1
2 TABLET ORAL ONCE
Status: COMPLETED | OUTPATIENT
Start: 2023-02-25 | End: 2023-02-25

## 2023-02-25 RX ORDER — ONDANSETRON 2 MG/ML
4 INJECTION INTRAMUSCULAR; INTRAVENOUS ONCE
Status: COMPLETED | OUTPATIENT
Start: 2023-02-25 | End: 2023-02-25

## 2023-02-25 RX ORDER — SODIUM CHLORIDE 0.9 % (FLUSH) 0.9 %
10 SYRINGE (ML) INJECTION AS NEEDED
Status: DISCONTINUED | OUTPATIENT
Start: 2023-02-25 | End: 2023-02-25 | Stop reason: HOSPADM

## 2023-02-25 RX ORDER — LIDOCAINE HYDROCHLORIDE 20 MG/ML
15 SOLUTION OROPHARYNGEAL ONCE
Status: COMPLETED | OUTPATIENT
Start: 2023-02-25 | End: 2023-02-25

## 2023-02-25 RX ORDER — ALUMINA, MAGNESIA, AND SIMETHICONE 2400; 2400; 240 MG/30ML; MG/30ML; MG/30ML
15 SUSPENSION ORAL ONCE
Status: COMPLETED | OUTPATIENT
Start: 2023-02-25 | End: 2023-02-25

## 2023-02-25 RX ADMIN — SODIUM CHLORIDE 2000 ML: 9 INJECTION, SOLUTION INTRAVENOUS at 04:23

## 2023-02-25 RX ADMIN — ONDANSETRON 4 MG: 2 INJECTION INTRAMUSCULAR; INTRAVENOUS at 08:04

## 2023-02-25 RX ADMIN — ALUMINUM HYDROXIDE, MAGNESIUM HYDROXIDE, AND DIMETHICONE 15 ML: 400; 400; 40 SUSPENSION ORAL at 04:32

## 2023-02-25 RX ADMIN — ONDANSETRON 4 MG: 2 INJECTION INTRAMUSCULAR; INTRAVENOUS at 04:28

## 2023-02-25 RX ADMIN — PHENOBARBITAL, HYOSCYAMINE SULFATE, ATROPINE SULFATE, SCOPOLAMINE HYDROBROMIDE 32.4 MG: 16.2; .1037; .0194; .0065 TABLET ORAL at 04:31

## 2023-02-25 RX ADMIN — PANTOPRAZOLE SODIUM 40 MG: 40 INJECTION, POWDER, FOR SOLUTION INTRAVENOUS at 04:30

## 2023-02-25 RX ADMIN — LIDOCAINE HYDROCHLORIDE 15 ML: 20 SOLUTION ORAL at 04:32

## 2023-02-26 LAB
QT INTERVAL: 426 MS
QTC INTERVAL: 418 MS

## 2023-03-02 ENCOUNTER — APPOINTMENT (OUTPATIENT)
Dept: GENERAL RADIOLOGY | Facility: HOSPITAL | Age: 33
End: 2023-03-02
Payer: MEDICAID

## 2023-03-02 LAB
BASOPHILS # BLD AUTO: 0.04 10*3/MM3 (ref 0–0.2)
BASOPHILS NFR BLD AUTO: 0.4 % (ref 0–1.5)
DEPRECATED RDW RBC AUTO: 42.9 FL (ref 37–54)
EOSINOPHIL # BLD AUTO: 0.3 10*3/MM3 (ref 0–0.4)
EOSINOPHIL NFR BLD AUTO: 3.3 % (ref 0.3–6.2)
ERYTHROCYTE [DISTWIDTH] IN BLOOD BY AUTOMATED COUNT: 14.2 % (ref 12.3–15.4)
HCT VFR BLD AUTO: 46.4 % (ref 37.5–51)
HGB BLD-MCNC: 14.9 G/DL (ref 13–17.7)
IMM GRANULOCYTES # BLD AUTO: 0.09 10*3/MM3 (ref 0–0.05)
IMM GRANULOCYTES NFR BLD AUTO: 1 % (ref 0–0.5)
LYMPHOCYTES # BLD AUTO: 2.01 10*3/MM3 (ref 0.7–3.1)
LYMPHOCYTES NFR BLD AUTO: 21.9 % (ref 19.6–45.3)
MCH RBC QN AUTO: 27.3 PG (ref 26.6–33)
MCHC RBC AUTO-ENTMCNC: 32.1 G/DL (ref 31.5–35.7)
MCV RBC AUTO: 85.1 FL (ref 79–97)
MONOCYTES # BLD AUTO: 0.89 10*3/MM3 (ref 0.1–0.9)
MONOCYTES NFR BLD AUTO: 9.7 % (ref 5–12)
NEUTROPHILS NFR BLD AUTO: 5.83 10*3/MM3 (ref 1.7–7)
NEUTROPHILS NFR BLD AUTO: 63.7 % (ref 42.7–76)
NRBC BLD AUTO-RTO: 0 /100 WBC (ref 0–0.2)
PLATELET # BLD AUTO: 246 10*3/MM3 (ref 140–450)
PMV BLD AUTO: 9.9 FL (ref 6–12)
RBC # BLD AUTO: 5.45 10*6/MM3 (ref 4.14–5.8)
WBC NRBC COR # BLD: 9.16 10*3/MM3 (ref 3.4–10.8)

## 2023-03-02 PROCEDURE — 84484 ASSAY OF TROPONIN QUANT: CPT | Performed by: NURSE PRACTITIONER

## 2023-03-02 PROCEDURE — 71045 X-RAY EXAM CHEST 1 VIEW: CPT

## 2023-03-02 PROCEDURE — 86140 C-REACTIVE PROTEIN: CPT | Performed by: NURSE PRACTITIONER

## 2023-03-02 PROCEDURE — 82150 ASSAY OF AMYLASE: CPT | Performed by: NURSE PRACTITIONER

## 2023-03-02 PROCEDURE — 93010 ELECTROCARDIOGRAM REPORT: CPT | Performed by: INTERNAL MEDICINE

## 2023-03-02 PROCEDURE — 36415 COLL VENOUS BLD VENIPUNCTURE: CPT

## 2023-03-02 PROCEDURE — 85025 COMPLETE CBC W/AUTO DIFF WBC: CPT | Performed by: NURSE PRACTITIONER

## 2023-03-02 PROCEDURE — 83690 ASSAY OF LIPASE: CPT | Performed by: NURSE PRACTITIONER

## 2023-03-02 PROCEDURE — 80053 COMPREHEN METABOLIC PANEL: CPT | Performed by: NURSE PRACTITIONER

## 2023-03-02 PROCEDURE — 93005 ELECTROCARDIOGRAM TRACING: CPT | Performed by: NURSE PRACTITIONER

## 2023-03-02 PROCEDURE — 85652 RBC SED RATE AUTOMATED: CPT | Performed by: NURSE PRACTITIONER

## 2023-03-02 PROCEDURE — 99284 EMERGENCY DEPT VISIT MOD MDM: CPT

## 2023-03-02 PROCEDURE — 81001 URINALYSIS AUTO W/SCOPE: CPT | Performed by: NURSE PRACTITIONER

## 2023-03-03 ENCOUNTER — HOSPITAL ENCOUNTER (EMERGENCY)
Facility: HOSPITAL | Age: 33
Discharge: ANOTHER HOSPITAL | End: 2023-03-03
Attending: EMERGENCY MEDICINE | Admitting: EMERGENCY MEDICINE
Payer: MEDICAID

## 2023-03-03 ENCOUNTER — APPOINTMENT (OUTPATIENT)
Dept: CT IMAGING | Facility: HOSPITAL | Age: 33
End: 2023-03-03
Payer: MEDICAID

## 2023-03-03 ENCOUNTER — APPOINTMENT (OUTPATIENT)
Dept: ULTRASOUND IMAGING | Facility: HOSPITAL | Age: 33
End: 2023-03-03
Payer: MEDICAID

## 2023-03-03 VITALS
RESPIRATION RATE: 18 BRPM | HEIGHT: 66 IN | TEMPERATURE: 98 F | HEART RATE: 74 BPM | DIASTOLIC BLOOD PRESSURE: 72 MMHG | OXYGEN SATURATION: 96 % | WEIGHT: 234 LBS | SYSTOLIC BLOOD PRESSURE: 117 MMHG | BODY MASS INDEX: 37.61 KG/M2

## 2023-03-03 DIAGNOSIS — R79.89 ELEVATED LFTS: ICD-10-CM

## 2023-03-03 DIAGNOSIS — K83.8 COMMON BILE DUCT DILATATION: ICD-10-CM

## 2023-03-03 DIAGNOSIS — K81.0 ACUTE CHOLECYSTITIS: Primary | ICD-10-CM

## 2023-03-03 LAB
ALBUMIN SERPL-MCNC: 4.1 G/DL (ref 3.5–5.2)
ALBUMIN/GLOB SERPL: 1.1 G/DL
ALP SERPL-CCNC: 230 U/L (ref 39–117)
ALT SERPL W P-5'-P-CCNC: 395 U/L (ref 1–41)
AMYLASE SERPL-CCNC: 28 U/L (ref 28–100)
ANION GAP SERPL CALCULATED.3IONS-SCNC: 10.5 MMOL/L (ref 5–15)
AST SERPL-CCNC: 114 U/L (ref 1–40)
BACTERIA UR QL AUTO: NORMAL /HPF
BILIRUB SERPL-MCNC: 6.1 MG/DL (ref 0–1.2)
BILIRUB UR QL STRIP: ABNORMAL
BUN SERPL-MCNC: 8 MG/DL (ref 6–20)
BUN/CREAT SERPL: 9.6 (ref 7–25)
CALCIUM SPEC-SCNC: 9.8 MG/DL (ref 8.6–10.5)
CHLORIDE SERPL-SCNC: 100 MMOL/L (ref 98–107)
CLARITY UR: CLEAR
CO2 SERPL-SCNC: 29.5 MMOL/L (ref 22–29)
COLOR UR: ABNORMAL
CREAT SERPL-MCNC: 0.83 MG/DL (ref 0.76–1.27)
CRP SERPL-MCNC: 0.99 MG/DL (ref 0–0.5)
EGFRCR SERPLBLD CKD-EPI 2021: 117.8 ML/MIN/1.73
ERYTHROCYTE [SEDIMENTATION RATE] IN BLOOD: 41 MM/HR (ref 0–15)
FLUAV RNA RESP QL NAA+PROBE: NOT DETECTED
FLUBV RNA RESP QL NAA+PROBE: NOT DETECTED
GLOBULIN UR ELPH-MCNC: 3.7 GM/DL
GLUCOSE SERPL-MCNC: 109 MG/DL (ref 65–99)
GLUCOSE UR STRIP-MCNC: NEGATIVE MG/DL
HGB UR QL STRIP.AUTO: ABNORMAL
HOLD SPECIMEN: NORMAL
HOLD SPECIMEN: NORMAL
HYALINE CASTS UR QL AUTO: NORMAL /LPF
KETONES UR QL STRIP: NEGATIVE
LEUKOCYTE ESTERASE UR QL STRIP.AUTO: ABNORMAL
LIPASE SERPL-CCNC: 41 U/L (ref 13–60)
NITRITE UR QL STRIP: NEGATIVE
PH UR STRIP.AUTO: 6 [PH] (ref 5–8)
POTASSIUM SERPL-SCNC: 4 MMOL/L (ref 3.5–5.2)
PROT SERPL-MCNC: 7.8 G/DL (ref 6–8.5)
PROT UR QL STRIP: NEGATIVE
QT INTERVAL: 414 MS
QTC INTERVAL: 399 MS
RBC # UR STRIP: NORMAL /HPF
REF LAB TEST METHOD: NORMAL
SARS-COV-2 RNA RESP QL NAA+PROBE: NOT DETECTED
SODIUM SERPL-SCNC: 140 MMOL/L (ref 136–145)
SP GR UR STRIP: 1.01 (ref 1–1.03)
SQUAMOUS #/AREA URNS HPF: NORMAL /HPF
TROPONIN T SERPL HS-MCNC: <6 NG/L
UROBILINOGEN UR QL STRIP: ABNORMAL
WBC # UR STRIP: NORMAL /HPF
WHOLE BLOOD HOLD COAG: NORMAL
WHOLE BLOOD HOLD SPECIMEN: NORMAL

## 2023-03-03 PROCEDURE — 74176 CT ABD & PELVIS W/O CONTRAST: CPT

## 2023-03-03 PROCEDURE — 96376 TX/PRO/DX INJ SAME DRUG ADON: CPT

## 2023-03-03 PROCEDURE — 76705 ECHO EXAM OF ABDOMEN: CPT

## 2023-03-03 PROCEDURE — 25010000002 MORPHINE PER 10 MG: Performed by: NURSE PRACTITIONER

## 2023-03-03 PROCEDURE — 25010000002 MORPHINE PER 10 MG: Performed by: EMERGENCY MEDICINE

## 2023-03-03 PROCEDURE — 25010000002 ONDANSETRON PER 1 MG: Performed by: PHYSICIAN ASSISTANT

## 2023-03-03 PROCEDURE — 25010000002 ONDANSETRON PER 1 MG: Performed by: NURSE PRACTITIONER

## 2023-03-03 PROCEDURE — 96374 THER/PROPH/DIAG INJ IV PUSH: CPT

## 2023-03-03 PROCEDURE — 87636 SARSCOV2 & INF A&B AMP PRB: CPT | Performed by: NURSE PRACTITIONER

## 2023-03-03 PROCEDURE — 96375 TX/PRO/DX INJ NEW DRUG ADDON: CPT

## 2023-03-03 RX ORDER — ONDANSETRON 2 MG/ML
4 INJECTION INTRAMUSCULAR; INTRAVENOUS ONCE
Status: COMPLETED | OUTPATIENT
Start: 2023-03-03 | End: 2023-03-03

## 2023-03-03 RX ORDER — SODIUM CHLORIDE 0.9 % (FLUSH) 0.9 %
10 SYRINGE (ML) INJECTION AS NEEDED
Status: DISCONTINUED | OUTPATIENT
Start: 2023-03-03 | End: 2023-03-03 | Stop reason: HOSPADM

## 2023-03-03 RX ADMIN — SODIUM CHLORIDE 1000 ML: 9 INJECTION, SOLUTION INTRAVENOUS at 03:17

## 2023-03-03 RX ADMIN — MORPHINE SULFATE 4 MG: 4 INJECTION, SOLUTION INTRAMUSCULAR; INTRAVENOUS at 03:18

## 2023-03-03 RX ADMIN — ONDANSETRON 4 MG: 2 INJECTION INTRAMUSCULAR; INTRAVENOUS at 03:18

## 2023-03-03 RX ADMIN — MORPHINE SULFATE 4 MG: 4 INJECTION, SOLUTION INTRAMUSCULAR; INTRAVENOUS at 05:11

## 2023-03-03 RX ADMIN — ONDANSETRON 4 MG: 2 INJECTION INTRAMUSCULAR; INTRAVENOUS at 08:27

## 2023-03-03 RX ADMIN — MORPHINE SULFATE 4 MG: 4 INJECTION, SOLUTION INTRAMUSCULAR; INTRAVENOUS at 08:27

## 2023-03-03 NOTE — ED NOTES
MEDICAL SCREENING:    Reason for Visit: Epigastric Pain    Patient initially seen in triage.  The patient was advised further evaluation and diagnostic testing will be needed, some of the treatment and testing will be initiated in the lobby in order to begin the process.  The patient will be returned to the waiting area for the time being and possibly be re-assessed by a subsequent ED provider.  The patient will be brought back to the treatment area in as timely manner as possible.       Reina Madrid, APRN  03/02/23 0954

## 2023-03-03 NOTE — ED PROVIDER NOTES
Subjective   History of Present Illness  Patient is a 32-year-old male with no significant past medical history presenting to the ER complaints of epigastric pain radiating to his mid back.  Patient also reports that he is noticed some discoloration in his skin and eyes.  Patient is concerned that he may have gallbladder issue.  Patient denies fever, cough, nausea, vomiting, chest pain, shortness of air or any additional symptoms today.  Patient reports this pain has been going on for several weeks worsening with food and no relieving factors.    History provided by:  Patient   used: No        Review of Systems   Constitutional: Negative.  Negative for fever.   HENT: Negative.    Respiratory: Negative.    Cardiovascular: Negative.  Negative for chest pain.   Gastrointestinal: Positive for abdominal pain.   Endocrine: Negative.    Genitourinary: Negative.  Negative for dysuria.   Skin: Positive for color change.   Neurological: Negative.    Psychiatric/Behavioral: Negative.    All other systems reviewed and are negative.      Past Medical History:   Diagnosis Date   • Anxiety        Allergies   Allergen Reactions   • Aspirin Swelling       Past Surgical History:   Procedure Laterality Date   • CARDIAC CATHETERIZATION N/A 2020    Procedure: Left Heart Cath;  Surgeon: Kevin Petersen MD;  Location: [facility] CATH INVASIVE LOCATION;  Service: Cardiology;  Laterality: N/A;   • KNEE ARTHROSCOPY W/ MENISCECTOMY Left 3/7/2022    Procedure: LEFT KNEE ARTHROSCOPIC  MENISCAL REPAIR;  Surgeon: Markel Greco MD;  Location: [facility] OR;  Service: Orthopedics;  Laterality: Left;       Family History   Problem Relation Age of Onset   • Heart disease Father    • Heart disease Paternal Grandmother        Social History     Socioeconomic History   • Marital status: Single   Tobacco Use   • Smoking status: Former     Packs/day: 1.50     Types: Cigarettes     Quit date: 2022     Years since quittin.0   •  Smokeless tobacco: Former     Types: Snuff   Vaping Use   • Vaping Use: Every day   • Substances: Nicotine, Flavoring   • Devices: Disposable   Substance and Sexual Activity   • Alcohol use: No   • Drug use: Never   • Sexual activity: Defer           Objective   Physical Exam  Vitals and nursing note reviewed.   Constitutional:       General: He is not in acute distress.     Appearance: He is well-developed. He is not diaphoretic.   HENT:      Head: Normocephalic and atraumatic.      Right Ear: External ear normal.      Left Ear: External ear normal.      Nose: Nose normal.   Eyes:      Conjunctiva/sclera: Conjunctivae normal.      Pupils: Pupils are equal, round, and reactive to light.   Neck:      Vascular: No JVD.      Trachea: No tracheal deviation.   Cardiovascular:      Rate and Rhythm: Normal rate and regular rhythm.      Heart sounds: Normal heart sounds. No murmur heard.  Pulmonary:      Effort: Pulmonary effort is normal. No respiratory distress.      Breath sounds: Normal breath sounds. No wheezing.   Abdominal:      General: Bowel sounds are normal.      Palpations: Abdomen is soft.      Tenderness: There is generalized abdominal tenderness. Positive signs include Francois's sign.   Musculoskeletal:         General: No deformity. Normal range of motion.      Cervical back: Normal range of motion and neck supple.   Skin:     General: Skin is warm and dry.      Capillary Refill: Capillary refill takes less than 2 seconds.      Coloration: Skin is jaundiced. Skin is not pale.      Findings: No erythema or rash.   Neurological:      Mental Status: He is alert and oriented to person, place, and time.      Cranial Nerves: No cranial nerve deficit.   Psychiatric:         Behavior: Behavior normal.         Thought Content: Thought content normal.         Procedures       Results for orders placed or performed during the hospital encounter of 03/03/23   COVID-19 and FLU A/B PCR - Swab, Nasopharynx    Specimen:  Nasopharynx; Swab   Result Value Ref Range    COVID19 Not Detected Not Detected - Ref. Range    Influenza A PCR Not Detected Not Detected    Influenza B PCR Not Detected Not Detected   Comprehensive Metabolic Panel    Specimen: Blood   Result Value Ref Range    Glucose 109 (H) 65 - 99 mg/dL    BUN 8 6 - 20 mg/dL    Creatinine 0.83 0.76 - 1.27 mg/dL    Sodium 140 136 - 145 mmol/L    Potassium 4.0 3.5 - 5.2 mmol/L    Chloride 100 98 - 107 mmol/L    CO2 29.5 (H) 22.0 - 29.0 mmol/L    Calcium 9.8 8.6 - 10.5 mg/dL    Total Protein 7.8 6.0 - 8.5 g/dL    Albumin 4.1 3.5 - 5.2 g/dL    ALT (SGPT) 395 (H) 1 - 41 U/L    AST (SGOT) 114 (H) 1 - 40 U/L    Alkaline Phosphatase 230 (H) 39 - 117 U/L    Total Bilirubin 6.1 (H) 0.0 - 1.2 mg/dL    Globulin 3.7 gm/dL    A/G Ratio 1.1 g/dL    BUN/Creatinine Ratio 9.6 7.0 - 25.0    Anion Gap 10.5 5.0 - 15.0 mmol/L    eGFR 117.8 >60.0 mL/min/1.73   Lipase    Specimen: Blood   Result Value Ref Range    Lipase 41 13 - 60 U/L   Urinalysis With Microscopic If Indicated (No Culture) - Urine, Clean Catch    Specimen: Urine, Clean Catch   Result Value Ref Range    Color, UA Dark Yellow (A) Yellow, Straw    Appearance, UA Clear Clear    pH, UA 6.0 5.0 - 8.0    Specific Gravity, UA 1.013 1.005 - 1.030    Glucose, UA Negative Negative    Ketones, UA Negative Negative    Bilirubin, UA Moderate (2+) (A) Negative    Blood, UA Moderate (2+) (A) Negative    Protein, UA Negative Negative    Leuk Esterase, UA Trace (A) Negative    Nitrite, UA Negative Negative    Urobilinogen, UA 1.0 E.U./dL 0.2 - 1.0 E.U./dL   Amylase    Specimen: Blood   Result Value Ref Range    Amylase 28 28 - 100 U/L   C-reactive Protein    Specimen: Blood   Result Value Ref Range    C-Reactive Protein 0.99 (H) 0.00 - 0.50 mg/dL   Sedimentation Rate    Specimen: Blood   Result Value Ref Range    Sed Rate 41 (H) 0 - 15 mm/hr   Single High Sensitivity Troponin T    Specimen: Blood   Result Value Ref Range    HS Troponin T <6 <15 ng/L    CBC Auto Differential    Specimen: Blood   Result Value Ref Range    WBC 9.16 3.40 - 10.80 10*3/mm3    RBC 5.45 4.14 - 5.80 10*6/mm3    Hemoglobin 14.9 13.0 - 17.7 g/dL    Hematocrit 46.4 37.5 - 51.0 %    MCV 85.1 79.0 - 97.0 fL    MCH 27.3 26.6 - 33.0 pg    MCHC 32.1 31.5 - 35.7 g/dL    RDW 14.2 12.3 - 15.4 %    RDW-SD 42.9 37.0 - 54.0 fl    MPV 9.9 6.0 - 12.0 fL    Platelets 246 140 - 450 10*3/mm3    Neutrophil % 63.7 42.7 - 76.0 %    Lymphocyte % 21.9 19.6 - 45.3 %    Monocyte % 9.7 5.0 - 12.0 %    Eosinophil % 3.3 0.3 - 6.2 %    Basophil % 0.4 0.0 - 1.5 %    Immature Grans % 1.0 (H) 0.0 - 0.5 %    Neutrophils, Absolute 5.83 1.70 - 7.00 10*3/mm3    Lymphocytes, Absolute 2.01 0.70 - 3.10 10*3/mm3    Monocytes, Absolute 0.89 0.10 - 0.90 10*3/mm3    Eosinophils, Absolute 0.30 0.00 - 0.40 10*3/mm3    Basophils, Absolute 0.04 0.00 - 0.20 10*3/mm3    Immature Grans, Absolute 0.09 (H) 0.00 - 0.05 10*3/mm3    nRBC 0.0 0.0 - 0.2 /100 WBC   Urinalysis, Microscopic Only - Urine, Clean Catch    Specimen: Urine, Clean Catch   Result Value Ref Range    RBC, UA 0-2 None Seen, 0-2 /HPF    WBC, UA None Seen None Seen, 0-2 /HPF    Bacteria, UA None Seen None Seen /HPF    Squamous Epithelial Cells, UA None Seen None Seen, 0-2 /HPF    Hyaline Casts, UA None Seen None Seen /LPF    Methodology Automated Microscopy    ECG 12 Lead Chest Pain   Result Value Ref Range    QT Interval 414 ms    QTC Interval 399 ms   Green Top (Gel)   Result Value Ref Range    Extra Tube Hold for add-ons.    Lavender Top   Result Value Ref Range    Extra Tube hold for add-on    Gold Top - SST   Result Value Ref Range    Extra Tube Hold for add-ons.    Light Blue Top   Result Value Ref Range    Extra Tube Hold for add-ons.      CT Abdomen Pelvis Without Contrast   Final Result   1. Contracted gallbladder and dilated common bile duct. Acute cholecystitis is not excluded on the exam.   2. Multiple punctate bilateral nonobstructing intrarenal calculi.   3.  Normal appendix.               Signer Name: Gaston Wagner MD    Signed: 3/3/2023 3:52 AM    Workstation Name: [facility]     Radiology [facility]      US Gallbladder   Final Result   Questionable cholelithiasis with abnormal gallbladder wall thickening and common bile duct dilatation. Positive sonographic Francois sign reported. Correlate with clinical symptoms for acute cholecystitis.      Signer Name: Gaston Wagner MD    Signed: 3/3/2023 12:58 AM    Workstation Name: [facility]     Radiology [facility]      XR Chest 1 View   Final Result   No acute cardiopulmonary findings.      Signer Name: Gaston Wagner MD    Signed: 3/2/2023 11:31 PM    Workstation Name: [facility]     Radiology [facility]          ED Course  ED Course as of 03/09/23 1122   Fri Mar 03, 2023   0302 Total Bilirubin(!): 6.1 []   0302 US Gallbladder  IMPRESSION:  Questionable cholelithiasis with abnormal gallbladder wall thickening and common bile duct dilatation. Positive sonographic Francois sign reported. Correlate with clinical symptoms for acute cholecystitis.     Signer Name: Gaston Wagner MD   Signed: 3/3/2023 12:58 AM   Workstation Name: [facility]    Radiology [facility] [SM]   0303 XR Chest 1 View []   0356 CT Abdomen Pelvis Without Contrast  IMPRESSION:  1. Contracted gallbladder and dilated common bile duct. Acute cholecystitis is not excluded on the exam.  2. Multiple punctate bilateral nonobstructing intrarenal calculi.  3. Normal appendix.              Signer Name: Gaston Wagner MD   Signed: 3/3/2023 3:52 AM   Workstation Name: [facility]    Radiology [facility] [SM]   0402 Discussed case with Dr. Celestina HUFF with [facility] for transfer.  [SM]   0409 Discussed case with Dr. Rosa hospitalist at [facility] agrees to accept for transfer [SM]   0683 EKG shows sinus bradycardia, rate 56.  WV interval 136, QRS duration 106, QTc 399 ms.  No  apparent acute ischemia.  No evidence for STEMI. [CM]   0754 Endorsed to SHIVA Scanlon at shift change  []      ED Course User Index  [CM] Sohail Bonilla MD  [] Reina Madrid, SHERWIN                                           Medical Decision Making  Patient is a 32-year-old male with no significant past medical history presenting to the ER complaints of epigastric pain radiating to his mid back.  Patient also reports that he is noticed some discoloration in his skin and eyes.  Patient is concerned that he may have gallbladder issue.  Patient denies fever, cough, nausea, vomiting, chest pain, shortness of air or any additional symptoms today.  Patient reports this pain has been going on for several weeks worsening with food and no relieving factors.  No GI coverage at this facility.  Patient to be transferred to [facility].  Patient needs ERCP.  Patient vital signs are stable.    Acute cholecystitis: complicated acute illness or injury  Common bile duct dilatation: complicated acute illness or injury  Elevated LFTs: complicated acute illness or injury  Amount and/or Complexity of Data Reviewed  Labs: ordered. Decision-making details documented in ED Course.  Radiology: ordered. Decision-making details documented in ED Course.  ECG/medicine tests: ordered.      Risk  Prescription drug management.          Final diagnoses:   Acute cholecystitis   Common bile duct dilatation   Elevated LFTs       ED Disposition  ED Disposition     ED Disposition   Transfer to Another Facility     Condition   --    Comment   --             No follow-up provider specified.       Medication List      No changes were made to your prescriptions during this visit.          Reina Madrid, SHERWIN  03/03/23 0412       Reina Madrid, SHERWIN  03/09/23 1122

## 2023-03-03 NOTE — ED NOTES
Called house supervisor Nancy for in house transfer. She will pass it to the day shift crew at shift change. She is also aware of transfer.

## 2023-03-03 NOTE — ED NOTES
Spoke with Teresa with [facility]. Patient is going to room 331, number for report is [phone]. Primary RN Albertina andrew.

## 2024-09-16 VITALS
HEIGHT: 66 IN | BODY MASS INDEX: 45.48 KG/M2 | SYSTOLIC BLOOD PRESSURE: 125 MMHG | HEART RATE: 76 BPM | WEIGHT: 283 LBS | DIASTOLIC BLOOD PRESSURE: 85 MMHG | RESPIRATION RATE: 18 BRPM | TEMPERATURE: 98 F | OXYGEN SATURATION: 97 %

## 2024-09-16 PROCEDURE — 96372 THER/PROPH/DIAG INJ SC/IM: CPT

## 2024-09-16 PROCEDURE — 99283 EMERGENCY DEPT VISIT LOW MDM: CPT

## 2024-09-17 ENCOUNTER — HOSPITAL ENCOUNTER (EMERGENCY)
Facility: HOSPITAL | Age: 34
Discharge: HOME | End: 2024-09-17
Attending: STUDENT IN AN ORGANIZED HEALTH CARE EDUCATION/TRAINING PROGRAM | Admitting: STUDENT IN AN ORGANIZED HEALTH CARE EDUCATION/TRAINING PROGRAM
Payer: MEDICAID

## 2024-09-17 ENCOUNTER — APPOINTMENT (OUTPATIENT)
Dept: GENERAL RADIOLOGY | Facility: HOSPITAL | Age: 34
End: 2024-09-17
Payer: MEDICAID

## 2024-09-17 DIAGNOSIS — M25.561 ACUTE PAIN OF RIGHT KNEE: Primary | ICD-10-CM

## 2024-09-17 PROCEDURE — 73562 X-RAY EXAM OF KNEE 3: CPT

## 2024-09-17 PROCEDURE — 73562 X-RAY EXAM OF KNEE 3: CPT | Performed by: RADIOLOGY

## 2024-09-17 PROCEDURE — 25010000002 KETOROLAC TROMETHAMINE PER 15 MG: Performed by: STUDENT IN AN ORGANIZED HEALTH CARE EDUCATION/TRAINING PROGRAM

## 2024-09-17 PROCEDURE — 25010000002 DEXAMETHASONE SODIUM PHOSPHATE 10 MG/ML SOLUTION: Performed by: STUDENT IN AN ORGANIZED HEALTH CARE EDUCATION/TRAINING PROGRAM

## 2024-09-17 RX ORDER — DEXAMETHASONE SODIUM PHOSPHATE 10 MG/ML
5 INJECTION, SOLUTION INTRAMUSCULAR; INTRAVENOUS ONCE
Status: COMPLETED | OUTPATIENT
Start: 2024-09-17 | End: 2024-09-17

## 2024-09-17 RX ORDER — CYCLOBENZAPRINE HCL 10 MG
10 TABLET ORAL ONCE
Status: COMPLETED | OUTPATIENT
Start: 2024-09-17 | End: 2024-09-17

## 2024-09-17 RX ORDER — KETOROLAC TROMETHAMINE 30 MG/ML
30 INJECTION, SOLUTION INTRAMUSCULAR; INTRAVENOUS ONCE
Status: COMPLETED | OUTPATIENT
Start: 2024-09-17 | End: 2024-09-17

## 2024-09-17 RX ADMIN — KETOROLAC TROMETHAMINE 30 MG: 60 INJECTION, SOLUTION INTRAMUSCULAR at 00:54

## 2024-09-17 RX ADMIN — DEXAMETHASONE SODIUM PHOSPHATE 5 MG: 10 INJECTION INTRAMUSCULAR; INTRAVENOUS at 00:55

## 2024-09-17 RX ADMIN — CYCLOBENZAPRINE 10 MG: 10 TABLET, FILM COATED ORAL at 00:55

## 2025-05-15 ENCOUNTER — TRANSCRIBE ORDERS (OUTPATIENT)
Dept: ADMINISTRATIVE | Facility: HOSPITAL | Age: 35
End: 2025-05-15
Payer: MEDICAID

## 2025-05-15 DIAGNOSIS — M25.561 RIGHT KNEE PAIN, UNSPECIFIED CHRONICITY: Primary | ICD-10-CM

## 2025-05-23 ENCOUNTER — HOSPITAL ENCOUNTER (OUTPATIENT)
Dept: MRI IMAGING | Facility: HOSPITAL | Age: 35
Discharge: HOME | End: 2025-05-23
Payer: MEDICAID

## 2025-05-23 DIAGNOSIS — M25.561 RIGHT KNEE PAIN, UNSPECIFIED CHRONICITY: ICD-10-CM

## 2025-05-23 PROCEDURE — 73721 MRI JNT OF LWR EXTRE W/O DYE: CPT

## 2025-06-25 DIAGNOSIS — M25.561 RIGHT KNEE PAIN, UNSPECIFIED CHRONICITY: Primary | ICD-10-CM

## 2025-07-09 ENCOUNTER — OFFICE VISIT (OUTPATIENT)
Dept: ORTHOPEDIC SURGERY | Facility: CLINIC | Age: 35
End: 2025-07-09
Payer: MEDICAID

## 2025-07-09 ENCOUNTER — HOSPITAL ENCOUNTER (OUTPATIENT)
Dept: GENERAL RADIOLOGY | Facility: HOSPITAL | Age: 35
Discharge: HOME | End: 2025-07-09
Payer: MEDICAID

## 2025-07-09 VITALS
WEIGHT: 282.19 LBS | BODY MASS INDEX: 45.35 KG/M2 | SYSTOLIC BLOOD PRESSURE: 132 MMHG | DIASTOLIC BLOOD PRESSURE: 86 MMHG | HEIGHT: 66 IN | HEART RATE: 70 BPM

## 2025-07-09 DIAGNOSIS — M25.561 RIGHT KNEE PAIN, UNSPECIFIED CHRONICITY: ICD-10-CM

## 2025-07-09 DIAGNOSIS — S83.281A TEAR OF LATERAL MENISCUS OF RIGHT KNEE, CURRENT, UNSPECIFIED TEAR TYPE, INITIAL ENCOUNTER: Primary | ICD-10-CM

## 2025-07-09 PROCEDURE — 99203 OFFICE O/P NEW LOW 30 MIN: CPT | Performed by: ORTHOPAEDIC SURGERY

## 2025-07-09 RX ORDER — IBUPROFEN 200 MG
200 TABLET ORAL EVERY 6 HOURS PRN
COMMUNITY
End: 2025-07-16

## 2025-07-09 NOTE — PROGRESS NOTES
History and Physical      Patient: Don Salamanca  YOB: 1990  Date of Encounter: 07/09/2025      Chief Complaint:   Chief Complaint   Patient presents with    Right Knee - Pain, Edema, Initial Evaluation           HPI:   Don Salamanca, 35 y.o. male, presents for evaluation of right knee pain reports over a year ago he was working under a vehicle when he twisted his knee and has experience pain with limited motion and giving way sensation since he continues to struggle and is unable to extend his knee.  Reviewing medical records he was evaluated [facility] September 2020 for an MRI was ordered.  He did not return.        Active Problem List:  Patient Active Problem List   Diagnosis    Chest pain    Abnormal nuclear stress test    Angina, class III    Tear of lateral meniscus of left knee, current           Past Medical History:  Past Medical History:   Diagnosis Date    Anxiety            Past Surgical History:  Past Surgical History:   Procedure Laterality Date    CARDIAC CATHETERIZATION N/A 7/24/2020    Procedure: Left Heart Cath;  Surgeon: Kevin Petersen MD;  Location: [facility] CATH INVASIVE LOCATION;  Service: Cardiology;  Laterality: N/A;    KNEE ARTHROSCOPY W/ MENISCECTOMY Left 3/7/2022    Procedure: LEFT KNEE ARTHROSCOPIC  MENISCAL REPAIR;  Surgeon: Markel Greco MD;  Location: [facility];  Service: Orthopedics;  Laterality: Left;           Family History:  Family History   Problem Relation Age of Onset    Heart disease Father     Heart disease Paternal Grandmother            Social History:  Social History     Socioeconomic History    Marital status: Single   Tobacco Use    Smoking status: Former     Current packs/day: 0.00     Types: Cigarettes     Quit date: 02/2022     Years since quitting: 3.4    Smokeless tobacco: Former     Types: Snuff   Vaping Use    Vaping status: Every Day    Substances: Nicotine, Flavoring    Devices: Disposable   Substance and Sexual Activity    Alcohol use: No  "   Drug use: Never    Sexual activity: Defer     Body mass index is 45.57 kg/m².  Class 3 Severe Obesity (BMI >=40). Obesity-related health conditions include the following: GERD. Obesity is unchanged. BMI is is above average; no BMI management plan is appropriate. We discussed portion control and increasing exercise.        Medications:  Current Outpatient Medications   Medication Sig Dispense Refill    ibuprofen (ADVIL,MOTRIN) 200 MG tablet Take 1 tablet by mouth Every 6 (Six) Hours As Needed for Mild Pain.      ondansetron (Zofran) 4 MG tablet Take 1 tablet by mouth Every 8 (Eight) Hours As Needed for Nausea or Vomiting. 20 tablet 0    celecoxib (CeleBREX) 200 MG capsule Take 1 capsule by mouth Daily. 14 capsule 0    diclofenac (VOLTAREN) 75 MG EC tablet Take 1 tablet by mouth 2 (Two) Times a Day. 30 tablet 0    gabapentin (Neurontin) 300 MG capsule Take 1 capsule by mouth every night at bedtime. 14 capsule 0    oxyCODONE (ROXICODONE) 5 MG immediate release tablet Take 1 tablet by mouth Every 6 (Six) Hours As Needed for Moderate Pain . 30 tablet 0    tiZANidine (ZANAFLEX) 4 MG tablet Take 1 tablet by mouth Every 8 (Eight) Hours As Needed (pain). 30 tablet 0     No current facility-administered medications for this visit.           Allergies:  Allergies   Allergen Reactions    Aspirin Swelling           Review of Systems:   Review of Systems   Constitutional: Negative.    HENT: Negative.     Eyes: Negative.    Respiratory: Negative.     Cardiovascular: Negative.    Gastrointestinal: Negative.    Endocrine: Negative.    Genitourinary: Negative.    Musculoskeletal:  Positive for arthralgias.   Skin: Negative.    Allergic/Immunologic: Negative.    Hematological: Negative.    Psychiatric/Behavioral: Negative.             Physical Exam:   Physical Exam  GENERAL: 35 y.o. male, alert and oriented X 3 in no acute distress.   Visit Vitals  /86   Pulse 70   Ht 167.6 cm (65.98\")   Wt 128 kg (282 lb 3 oz)   BMI 45.57 " kg/m²               Musculoskeletal Examination: Right knee reveals moderate effusion with the knee locked in about 15 degrees of flexion.  He has significant pain with attempted further extension flexion is at 90 degrees he demonstrates no gross instability with varus valgus stressing Lachman or drawer.  Neurovascular exam grossly intact patella with normal tracking and normal alignment.          Radiology/Labs:      EXAM:    MR Right Lower Extremity Without Intravenous Contrast, Knee     EXAM DATE:    5/23/2025 10:10 AM     CLINICAL HISTORY:    M25.561; M25.561-Pain in right knee     TECHNIQUE:    Multiplanar magnetic resonance images of the right knee without  intravenous contrast.     COMPARISON:    No relevant prior studies available.     FINDINGS:    Patellofemoral bones/joint/cartilage:  There is a tiny knee joint  effusion.    Femorotibial bones/joints/cartilage:  There is diffuse T2 signal  abnormality noted within the lateral femoral condyle and lateral tibial  plateau as well as within the fibular head that could represent bony  contusion.    Extensor mechanism:  Unremarkable as visualized.    Medial meniscus:  See below.      Lateral meniscus:  There is a tear horizontally involving the body of  the lateral meniscus and probably displaced tear of the posterior horn.    Medial capsule/supporting structures:  Unremarkable as visualized.   Normal medial collateral ligament.    Lateral capsule/supporting structures:  Unremarkable as visualized.   Normal lateral collateral ligament.    Anterior cruciate ligament:  The anterior cruciate ligament is intact.    Posterior cruciate ligament:  The posterior cruciate ligament is  intact. The medial meniscus is intact.    Musculature:  Unremarkable as visualized.    Soft tissues:  Unremarkable as visualized.     IMPRESSION:  1.  There is a tear horizontally involving the body of the lateral  meniscus and probably displaced tear of the posterior horn.  2.  There is  diffuse T2 signal abnormality noted within the lateral  femoral condyle and lateral tibial plateau as well as within the fibular  head that could represent bony contusion.     This report was finalized on 5/23/2025 10:14 AM by Dr. Vitaliy Clark MD.         Radiographs right knee negative by report.        Assessment & Plan:   35 y.o. male presents for evaluation of right knee pain clinical findings and MRI both convincing for bucket-handle tear with his knee locked and apparently has been so over the past year.  I think he is a good candidate for either partial lateral meniscectomy versus repair.      ICD-10-CM ICD-9-CM   1. Tear of lateral meniscus of right knee, current, unspecified tear type, initial encounter  S83.281A 836.1           Cc:   Magdalena Garcia, SHERWIN              This document has been electronically signed by Yang Naranjo MD   July 14, 2025 17:27 EDT

## 2025-07-09 NOTE — PROGRESS NOTES
Follow-up Visit         Patient: Don Salamanca  YOB: 1990  Date of Encounter: 07/09/2025      Chief  Complaint: No chief complaint on file.        HPI:  Don Salamanca, 35 y.o. male presents in follow-up        Medical History:  Patient Active Problem List   Diagnosis    Chest pain    Abnormal nuclear stress test    Angina, class III     Past Medical History:   Diagnosis Date    Anxiety            Social History:  Social History     Socioeconomic History    Marital status: Single   Tobacco Use    Smoking status: Former     Current packs/day: 0.00     Types: Cigarettes     Quit date: 02/2022     Years since quitting: 3.4    Smokeless tobacco: Former     Types: Snuff   Vaping Use    Vaping status: Every Day    Substances: Nicotine, Flavoring    Devices: Disposable   Substance and Sexual Activity    Alcohol use: No    Drug use: Never    Sexual activity: Defer           Current Medications:    Current Outpatient Medications:     celecoxib (CeleBREX) 200 MG capsule, Take 1 capsule by mouth Daily., Disp: 14 capsule, Rfl: 0    diclofenac (VOLTAREN) 75 MG EC tablet, Take 1 tablet by mouth 2 (Two) Times a Day., Disp: 30 tablet, Rfl: 0    gabapentin (Neurontin) 300 MG capsule, Take 1 capsule by mouth every night at bedtime., Disp: 14 capsule, Rfl: 0    ondansetron (Zofran) 4 MG tablet, Take 1 tablet by mouth Every 8 (Eight) Hours As Needed for Nausea or Vomiting., Disp: 20 tablet, Rfl: 0    oxyCODONE (ROXICODONE) 5 MG immediate release tablet, Take 1 tablet by mouth Every 6 (Six) Hours As Needed for Moderate Pain ., Disp: 30 tablet, Rfl: 0    tiZANidine (ZANAFLEX) 4 MG tablet, Take 1 tablet by mouth Every 8 (Eight) Hours As Needed (pain)., Disp: 30 tablet, Rfl: 0        Allergies:  Allergies   Allergen Reactions    Aspirin Swelling           Family History:  Family History   Problem Relation Age of Onset    Heart disease Father     Heart disease Paternal Grandmother            Surgical History:  Past  Surgical History:   Procedure Laterality Date    CARDIAC CATHETERIZATION N/A 7/24/2020    Procedure: Left Heart Cath;  Surgeon: Kevin Petersen MD;  Location: [facility] CATH INVASIVE LOCATION;  Service: Cardiology;  Laterality: N/A;    KNEE ARTHROSCOPY W/ MENISCECTOMY Left 3/7/2022    Procedure: LEFT KNEE ARTHROSCOPIC  MENISCAL REPAIR;  Surgeon: Markel Greco MD;  Location: [facility] OR;  Service: Orthopedics;  Laterality: Left;         Vitals:  There were no vitals filed for this visit.  There is no height or weight on file to calculate BMI.        Radiology:   No radiology results for the last 30 days.        Radiographs          Orthopedic Examination:          Assessment & Plan:   35 y.o. male presents           No diagnosis found.      Procedures        Cc:  Magdalena Garcia, SHERWIN              This document has been electronically signed by Yang Naranjo MD   July 9, 2025 09:25 EDT

## 2025-07-14 ENCOUNTER — OFFICE VISIT (OUTPATIENT)
Dept: ORTHOPEDIC SURGERY | Facility: CLINIC | Age: 35
End: 2025-07-14
Payer: MEDICAID

## 2025-07-14 VITALS — HEIGHT: 66 IN | BODY MASS INDEX: 45.35 KG/M2 | WEIGHT: 282.19 LBS

## 2025-07-14 DIAGNOSIS — S83.282D TEAR OF LATERAL MENISCUS OF LEFT KNEE, CURRENT, UNSPECIFIED TEAR TYPE, SUBSEQUENT ENCOUNTER: Primary | ICD-10-CM

## 2025-07-14 PROBLEM — S83.282A TEAR OF LATERAL MENISCUS OF LEFT KNEE, CURRENT: Status: ACTIVE | Noted: 2025-07-14

## 2025-07-14 PROCEDURE — 99214 OFFICE O/P EST MOD 30 MIN: CPT | Performed by: ORTHOPAEDIC SURGERY

## 2025-07-14 RX ORDER — CHLORHEXIDINE GLUCONATE 500 MG/1
CLOTH TOPICAL SEE ADMIN INSTRUCTIONS
Status: CANCELLED | OUTPATIENT
Start: 2025-07-14

## 2025-07-14 NOTE — H&P (VIEW-ONLY)
History and Physical      Patient: Don Salamanca  YOB: 1990  Date of Encounter: 07/14/2025      Chief Complaint:   Chief Complaint   Patient presents with    Right Knee - Pain, Follow-up           HPI:   Don Salamanca, 35 y.o. male, presents in follow-up right knee pain which began when he twisted his knee while working under motor vehicle.  Since that time he has been unable to fully extend his knee he walks with a limp and has significant pain.  He has had MRI completed which describes bucket-handle tear of the lateral meniscus.  He underwent arthroscopy left knee approximately 3 years ago and had successful repair of his lateral meniscus.  He presents today in follow-up to discuss surgical options.        Active Problem List:  Patient Active Problem List   Diagnosis    Chest pain    Abnormal nuclear stress test    Angina, class III    Tear of lateral meniscus of left knee, current           Past Medical History:  Past Medical History:   Diagnosis Date    Anxiety            Past Surgical History:  Past Surgical History:   Procedure Laterality Date    CARDIAC CATHETERIZATION N/A 7/24/2020    Procedure: Left Heart Cath;  Surgeon: Kevin Petersen MD;  Location: [facility] INVASIVE LOCATION;  Service: Cardiology;  Laterality: N/A;    KNEE ARTHROSCOPY W/ MENISCECTOMY Left 3/7/2022    Procedure: LEFT KNEE ARTHROSCOPIC  MENISCAL REPAIR;  Surgeon: Markel Greco MD;  Location: [facility];  Service: Orthopedics;  Laterality: Left;           Family History:  Family History   Problem Relation Age of Onset    Heart disease Father     Heart disease Paternal Grandmother            Social History:  Social History     Socioeconomic History    Marital status: Single   Tobacco Use    Smoking status: Former     Current packs/day: 0.00     Types: Cigarettes     Quit date: 02/2022     Years since quitting: 3.4    Smokeless tobacco: Former     Types: Snuff   Vaping Use    Vaping status: Every Day    Substances:  Nicotine, Flavoring    Devices: Disposable   Substance and Sexual Activity    Alcohol use: No    Drug use: Never    Sexual activity: Defer     Body mass index is 45.57 kg/m².        Medications:  Current Outpatient Medications   Medication Sig Dispense Refill    celecoxib (CeleBREX) 200 MG capsule Take 1 capsule by mouth Daily. 14 capsule 0    diclofenac (VOLTAREN) 75 MG EC tablet Take 1 tablet by mouth 2 (Two) Times a Day. 30 tablet 0    gabapentin (Neurontin) 300 MG capsule Take 1 capsule by mouth every night at bedtime. 14 capsule 0    ibuprofen (ADVIL,MOTRIN) 200 MG tablet Take 1 tablet by mouth Every 6 (Six) Hours As Needed for Mild Pain.      ondansetron (Zofran) 4 MG tablet Take 1 tablet by mouth Every 8 (Eight) Hours As Needed for Nausea or Vomiting. 20 tablet 0    oxyCODONE (ROXICODONE) 5 MG immediate release tablet Take 1 tablet by mouth Every 6 (Six) Hours As Needed for Moderate Pain . 30 tablet 0    tiZANidine (ZANAFLEX) 4 MG tablet Take 1 tablet by mouth Every 8 (Eight) Hours As Needed (pain). 30 tablet 0     No current facility-administered medications for this visit.           Allergies:  Allergies   Allergen Reactions    Aspirin Swelling           Review of Systems:   Review of Systems   Constitutional: Negative.    HENT: Negative.     Eyes: Negative.    Respiratory: Negative.     Cardiovascular: Negative.    Gastrointestinal: Negative.    Endocrine: Negative.    Genitourinary: Negative.    Musculoskeletal:  Positive for arthralgias.   Skin: Negative.    Allergic/Immunologic: Negative.    Hematological: Negative.    Psychiatric/Behavioral: Negative.             Physical Exam:   Physical Exam  Vitals and nursing note reviewed.   Constitutional:       General: He is not in acute distress.     Appearance: He is not diaphoretic.   HENT:      Head: Normocephalic and atraumatic.      Right Ear: External ear normal.      Left Ear: External ear normal.   Eyes:      General:         Right eye: No discharge.    "      Left eye: No discharge.      Conjunctiva/sclera: Conjunctivae normal.   Cardiovascular:      Rate and Rhythm: Normal rate and regular rhythm.      Heart sounds: Normal heart sounds. No murmur heard.  Pulmonary:      Effort: Pulmonary effort is normal. No respiratory distress.      Breath sounds: Normal breath sounds. No wheezing.   Abdominal:      General: There is no distension.      Palpations: Abdomen is soft.      Tenderness: There is no guarding.   Musculoskeletal:      Cervical back: Normal range of motion and neck supple.   Skin:     General: Skin is warm and dry.      Capillary Refill: Capillary refill takes less than 2 seconds.   Neurological:      Mental Status: He is alert and oriented to person, place, and time.   Psychiatric:         Behavior: Behavior normal.         Thought Content: Thought content normal.         Judgment: Judgment normal.     GENERAL: 35 y.o. male, alert and oriented X 3 in no acute distress.   Visit Vitals  Ht 167.6 cm (65.98\")   Wt 128 kg (282 lb 3 oz)   BMI 45.57 kg/m²   Class 3 Severe Obesity (BMI >=40). Obesity-related health conditions include the following: osteoarthritis. Obesity is unchanged. BMI is is above average; no BMI management plan is appropriate. We discussed portion control and increasing exercise.        Musculoskeletal Examination:   Right knee demonstrates moderate effusion he is flexed at 15 degrees and can be brought to approximately 10 degrees but has significant pain laterally.  Demonstrates no gross instability with varus valgus stressing Lachman or drawer.  Flexion is limited to approximately 70 degrees neurovascular exam is grossly intact.          Radiology/Labs:    EXAM:    MR Right Lower Extremity Without Intravenous Contrast, Knee     EXAM DATE:    5/23/2025 10:10 AM     CLINICAL HISTORY:    M25.561; M25.561-Pain in right knee     TECHNIQUE:    Multiplanar magnetic resonance images of the right knee without  intravenous contrast.   "   COMPARISON:    No relevant prior studies available.     FINDINGS:    Patellofemoral bones/joint/cartilage:  There is a tiny knee joint  effusion.    Femorotibial bones/joints/cartilage:  There is diffuse T2 signal  abnormality noted within the lateral femoral condyle and lateral tibial  plateau as well as within the fibular head that could represent bony  contusion.    Extensor mechanism:  Unremarkable as visualized.    Medial meniscus:  See below.      Lateral meniscus:  There is a tear horizontally involving the body of  the lateral meniscus and probably displaced tear of the posterior horn.    Medial capsule/supporting structures:  Unremarkable as visualized.   Normal medial collateral ligament.    Lateral capsule/supporting structures:  Unremarkable as visualized.   Normal lateral collateral ligament.    Anterior cruciate ligament:  The anterior cruciate ligament is intact.    Posterior cruciate ligament:  The posterior cruciate ligament is  intact. The medial meniscus is intact.    Musculature:  Unremarkable as visualized.    Soft tissues:  Unremarkable as visualized.     IMPRESSION:  1.  There is a tear horizontally involving the body of the lateral  meniscus and probably displaced tear of the posterior horn.  2.  There is diffuse T2 signal abnormality noted within the lateral  femoral condyle and lateral tibial plateau as well as within the fibular  head that could represent bony contusion.     This report was finalized on 5/23/2025 10:14 AM by JAY Garza        Radiographs right knee unremarkable  MRI right knee by report as above by my review obvious bucket-handle tear of lateral meniscus.      Assessment & Plan:   35 y.o. male presents in follow-up with locked right knee from bucket-handle tear of 1 years duration.  He is recommended right knee arthroscopy with probable partial lateral meniscectomy.  At his age 35 with block knee for 1 year I am doubtful that he would have good result with  attempted lateral meniscus tear he also informs me that he is unable to take 2 to 3 months off work afterwards.  He is scheduled right knee arthroscopy partial lateral meniscectomy July 17, 2025.      ICD-10-CM ICD-9-CM   1. Tear of lateral meniscus of left knee, current, unspecified tear type, subsequent encounter  S83.282D V58.89     836.1           Cc:   Magdalena Garcia, SHERWIN              This document has been electronically signed by Yang Naranjo MD   July 14, 2025 17:47 EDT

## 2025-07-14 NOTE — PROGRESS NOTES
History and Physical      Patient: Don Salamanca  YOB: 1990  Date of Encounter: 07/14/2025      Chief Complaint:   Chief Complaint   Patient presents with    Right Knee - Pain, Follow-up           HPI:   Don Salamanca, 35 y.o. male, presents in follow-up right knee pain which began when he twisted his knee while working under motor vehicle.  Since that time he has been unable to fully extend his knee he walks with a limp and has significant pain.  He has had MRI completed which describes bucket-handle tear of the lateral meniscus.  He underwent arthroscopy left knee approximately 3 years ago and had successful repair of his lateral meniscus.  He presents today in follow-up to discuss surgical options.        Active Problem List:  Patient Active Problem List   Diagnosis    Chest pain    Abnormal nuclear stress test    Angina, class III    Tear of lateral meniscus of left knee, current           Past Medical History:  Past Medical History:   Diagnosis Date    Anxiety            Past Surgical History:  Past Surgical History:   Procedure Laterality Date    CARDIAC CATHETERIZATION N/A 7/24/2020    Procedure: Left Heart Cath;  Surgeon: Kevin Petersen MD;  Location: [facility] INVASIVE LOCATION;  Service: Cardiology;  Laterality: N/A;    KNEE ARTHROSCOPY W/ MENISCECTOMY Left 3/7/2022    Procedure: LEFT KNEE ARTHROSCOPIC  MENISCAL REPAIR;  Surgeon: Markel Greco MD;  Location: [facility];  Service: Orthopedics;  Laterality: Left;           Family History:  Family History   Problem Relation Age of Onset    Heart disease Father     Heart disease Paternal Grandmother            Social History:  Social History     Socioeconomic History    Marital status: Single   Tobacco Use    Smoking status: Former     Current packs/day: 0.00     Types: Cigarettes     Quit date: 02/2022     Years since quitting: 3.4    Smokeless tobacco: Former     Types: Snuff   Vaping Use    Vaping status: Every Day    Substances:  Nicotine, Flavoring    Devices: Disposable   Substance and Sexual Activity    Alcohol use: No    Drug use: Never    Sexual activity: Defer     Body mass index is 45.57 kg/m².        Medications:  Current Outpatient Medications   Medication Sig Dispense Refill    celecoxib (CeleBREX) 200 MG capsule Take 1 capsule by mouth Daily. 14 capsule 0    diclofenac (VOLTAREN) 75 MG EC tablet Take 1 tablet by mouth 2 (Two) Times a Day. 30 tablet 0    gabapentin (Neurontin) 300 MG capsule Take 1 capsule by mouth every night at bedtime. 14 capsule 0    ibuprofen (ADVIL,MOTRIN) 200 MG tablet Take 1 tablet by mouth Every 6 (Six) Hours As Needed for Mild Pain.      ondansetron (Zofran) 4 MG tablet Take 1 tablet by mouth Every 8 (Eight) Hours As Needed for Nausea or Vomiting. 20 tablet 0    oxyCODONE (ROXICODONE) 5 MG immediate release tablet Take 1 tablet by mouth Every 6 (Six) Hours As Needed for Moderate Pain . 30 tablet 0    tiZANidine (ZANAFLEX) 4 MG tablet Take 1 tablet by mouth Every 8 (Eight) Hours As Needed (pain). 30 tablet 0     No current facility-administered medications for this visit.           Allergies:  Allergies   Allergen Reactions    Aspirin Swelling           Review of Systems:   Review of Systems   Constitutional: Negative.    HENT: Negative.     Eyes: Negative.    Respiratory: Negative.     Cardiovascular: Negative.    Gastrointestinal: Negative.    Endocrine: Negative.    Genitourinary: Negative.    Musculoskeletal:  Positive for arthralgias.   Skin: Negative.    Allergic/Immunologic: Negative.    Hematological: Negative.    Psychiatric/Behavioral: Negative.             Physical Exam:   Physical Exam  Vitals and nursing note reviewed.   Constitutional:       General: He is not in acute distress.     Appearance: He is not diaphoretic.   HENT:      Head: Normocephalic and atraumatic.      Right Ear: External ear normal.      Left Ear: External ear normal.   Eyes:      General:         Right eye: No discharge.    "      Left eye: No discharge.      Conjunctiva/sclera: Conjunctivae normal.   Cardiovascular:      Rate and Rhythm: Normal rate and regular rhythm.      Heart sounds: Normal heart sounds. No murmur heard.  Pulmonary:      Effort: Pulmonary effort is normal. No respiratory distress.      Breath sounds: Normal breath sounds. No wheezing.   Abdominal:      General: There is no distension.      Palpations: Abdomen is soft.      Tenderness: There is no guarding.   Musculoskeletal:      Cervical back: Normal range of motion and neck supple.   Skin:     General: Skin is warm and dry.      Capillary Refill: Capillary refill takes less than 2 seconds.   Neurological:      Mental Status: He is alert and oriented to person, place, and time.   Psychiatric:         Behavior: Behavior normal.         Thought Content: Thought content normal.         Judgment: Judgment normal.     GENERAL: 35 y.o. male, alert and oriented X 3 in no acute distress.   Visit Vitals  Ht 167.6 cm (65.98\")   Wt 128 kg (282 lb 3 oz)   BMI 45.57 kg/m²   Class 3 Severe Obesity (BMI >=40). Obesity-related health conditions include the following: osteoarthritis. Obesity is unchanged. BMI is is above average; no BMI management plan is appropriate. We discussed portion control and increasing exercise.        Musculoskeletal Examination:   Right knee demonstrates moderate effusion he is flexed at 15 degrees and can be brought to approximately 10 degrees but has significant pain laterally.  Demonstrates no gross instability with varus valgus stressing Lachman or drawer.  Flexion is limited to approximately 70 degrees neurovascular exam is grossly intact.          Radiology/Labs:    EXAM:    MR Right Lower Extremity Without Intravenous Contrast, Knee     EXAM DATE:    5/23/2025 10:10 AM     CLINICAL HISTORY:    M25.561; M25.561-Pain in right knee     TECHNIQUE:    Multiplanar magnetic resonance images of the right knee without  intravenous contrast.   "   COMPARISON:    No relevant prior studies available.     FINDINGS:    Patellofemoral bones/joint/cartilage:  There is a tiny knee joint  effusion.    Femorotibial bones/joints/cartilage:  There is diffuse T2 signal  abnormality noted within the lateral femoral condyle and lateral tibial  plateau as well as within the fibular head that could represent bony  contusion.    Extensor mechanism:  Unremarkable as visualized.    Medial meniscus:  See below.      Lateral meniscus:  There is a tear horizontally involving the body of  the lateral meniscus and probably displaced tear of the posterior horn.    Medial capsule/supporting structures:  Unremarkable as visualized.   Normal medial collateral ligament.    Lateral capsule/supporting structures:  Unremarkable as visualized.   Normal lateral collateral ligament.    Anterior cruciate ligament:  The anterior cruciate ligament is intact.    Posterior cruciate ligament:  The posterior cruciate ligament is  intact. The medial meniscus is intact.    Musculature:  Unremarkable as visualized.    Soft tissues:  Unremarkable as visualized.     IMPRESSION:  1.  There is a tear horizontally involving the body of the lateral  meniscus and probably displaced tear of the posterior horn.  2.  There is diffuse T2 signal abnormality noted within the lateral  femoral condyle and lateral tibial plateau as well as within the fibular  head that could represent bony contusion.     This report was finalized on 5/23/2025 10:14 AM by JAY Garza        Radiographs right knee unremarkable  MRI right knee by report as above by my review obvious bucket-handle tear of lateral meniscus.      Assessment & Plan:   35 y.o. male presents in follow-up with locked right knee from bucket-handle tear of 1 years duration.  He is recommended right knee arthroscopy with probable partial lateral meniscectomy.  At his age 35 with block knee for 1 year I am doubtful that he would have good result with  attempted lateral meniscus tear he also informs me that he is unable to take 2 to 3 months off work afterwards.  He is scheduled right knee arthroscopy partial lateral meniscectomy July 17, 2025.      ICD-10-CM ICD-9-CM   1. Tear of lateral meniscus of left knee, current, unspecified tear type, subsequent encounter  S83.282D V58.89     836.1           Cc:   Magdalena Garcia, SHERWIN              This document has been electronically signed by Yang Naranjo MD   July 14, 2025 17:47 EDT

## 2025-07-14 NOTE — PROGRESS NOTES
Follow-up Visit         Patient: Don Saalmanca  YOB: 1990  Date of Encounter: 07/14/2025      Chief  Complaint:   Chief Complaint   Patient presents with    Right Knee - Pain, Follow-up         HPI:  Don Salamanca, 35 y.o. male presents in follow-up        Medical History:  Patient Active Problem List   Diagnosis    Chest pain    Abnormal nuclear stress test    Angina, class III     Past Medical History:   Diagnosis Date    Anxiety            Social History:  Social History     Socioeconomic History    Marital status: Single   Tobacco Use    Smoking status: Former     Current packs/day: 0.00     Types: Cigarettes     Quit date: 02/2022     Years since quitting: 3.4    Smokeless tobacco: Former     Types: Snuff   Vaping Use    Vaping status: Every Day    Substances: Nicotine, Flavoring    Devices: Disposable   Substance and Sexual Activity    Alcohol use: No    Drug use: Never    Sexual activity: Defer           Current Medications:    Current Outpatient Medications:     celecoxib (CeleBREX) 200 MG capsule, Take 1 capsule by mouth Daily., Disp: 14 capsule, Rfl: 0    diclofenac (VOLTAREN) 75 MG EC tablet, Take 1 tablet by mouth 2 (Two) Times a Day., Disp: 30 tablet, Rfl: 0    gabapentin (Neurontin) 300 MG capsule, Take 1 capsule by mouth every night at bedtime., Disp: 14 capsule, Rfl: 0    ibuprofen (ADVIL,MOTRIN) 200 MG tablet, Take 1 tablet by mouth Every 6 (Six) Hours As Needed for Mild Pain., Disp: , Rfl:     ondansetron (Zofran) 4 MG tablet, Take 1 tablet by mouth Every 8 (Eight) Hours As Needed for Nausea or Vomiting., Disp: 20 tablet, Rfl: 0    oxyCODONE (ROXICODONE) 5 MG immediate release tablet, Take 1 tablet by mouth Every 6 (Six) Hours As Needed for Moderate Pain ., Disp: 30 tablet, Rfl: 0    tiZANidine (ZANAFLEX) 4 MG tablet, Take 1 tablet by mouth Every 8 (Eight) Hours As Needed (pain)., Disp: 30 tablet, Rfl: 0        Allergies:  Allergies   Allergen Reactions    Aspirin Swelling  "          Family History:  Family History   Problem Relation Age of Onset    Heart disease Father     Heart disease Paternal Grandmother            Surgical History:  Past Surgical History:   Procedure Laterality Date    CARDIAC CATHETERIZATION N/A 7/24/2020    Procedure: Left Heart Cath;  Surgeon: Kevin Petersen MD;  Location: [facility] CATH INVASIVE LOCATION;  Service: Cardiology;  Laterality: N/A;    KNEE ARTHROSCOPY W/ MENISCECTOMY Left 3/7/2022    Procedure: LEFT KNEE ARTHROSCOPIC  MENISCAL REPAIR;  Surgeon: Markel Greco MD;  Location: [facility] OR;  Service: Orthopedics;  Laterality: Left;         Vitals:  Vitals:    07/14/25 1430   Weight: 128 kg (282 lb 3 oz)   Height: 167.6 cm (65.98\")     Body mass index is 45.57 kg/m².        Radiology:   No radiology results for the last 30 days.        Radiographs          Orthopedic Examination:          Assessment & Plan:   35 y.o. male presents           No diagnosis found.      Procedures        Cc:  Magdalena Garcia, SHERWIN              This document has been electronically signed by Yang Naranjo MD   July 14, 2025 14:47 EDT  "

## 2025-07-15 NOTE — DISCHARGE INSTRUCTIONS
Please discontinue all blood thinners and anticoagulants (except aspirin) prior to surgery as per your surgeon and cardiologist instructions.  Aspirin may be continued up to the day prior to surgery.    HOLD all diabetic medications the morning of surgery as order by physician.    Please follow instructions on use of prep cloths provided by nurse. Return instruction sheet to pre-op nurse on day of surgery.    Arrival time for surgery on  7/17/25 will be given to you by Dr. Naranjo's  Office.    A RESPONSIBLE PERSON MUST REMAIN IN THE WAITING ROOM DURING YOUR PROCEDURE AND A RESPONSIBLE  MUST BE AVAILABLE UPON YOUR DISCHARGE.    General Instructions:  Do NOT eat or drink after midnight 7/16/25 which includes water, mints, or gum.  You may brush your teeth. Dental appliances that are removable must be taken out day of surgery.  Do NOT smoke, chew tobacco, or drink alcohol within 24 hours prior to surgery.  Bring medications in original bottles, any inhalers and if applicable your C-PAP/BI-PAP machine  Bring any papers given to you in the doctor’s office  Wear clean, comfortable clothes and socks  Do NOT wear contact lenses or make-up or dark nail polish.  Bring a case for your glasses if applicable.  Bring crutches or walker if applicable  Leave all other valuables and jewelry at home  If you were given a blood bank armband, continue to wear it until discharged.    Preventing a Surgical Site Infection:  Shower the night before surgery (unless instructed otherwise) using a fresh bar of anti-bacterial soap (such as Dial) and clean washcloth.  Dry with a clean towel and dress in clean clothing.  For 2 to 3 days before surgery, avoid shaving with a razor near where you will have surgery because the razor can irritate skin and make it easier to develop an infection.  Ask your surgeon if you will be receiving antibiotics prior to surgery.  Make sure you, your family, and all healthcare providers clean their hands  with soap and water or an alcohol-based hand  before caring for you or your wound.  If at all possible, quit smoking as many days before surgery as you can.    Day of Surgery:  Upon arrival, a pre-op nurse and anesthesiologist will review your health history, obtain vital signs, and answer questions you may have.  The only belongings needed at this time will be your home medications and if applicable you C-PAP/BI-PAP machine.  If you are staying overnight, your family can leave the rest of your belongings in the car and bring them to your room later.  A pre-op nurse will start an IV and you may receive medication in preparation for surgery.  Due to patient privacy and limited space, only one member of your family will be able to accompany you in the pre-op area.  While you are in surgery your family should notify the waiting room  if they leave the waiting room area and provide a contact number.  Please be aware that surgery does come with discomfort.  We want to make every effort to control your discomfort so please discuss any uncontrolled symptoms with your nurse.  Your doctor will most likely have prescribed pain medications.  If you are going home after surgery you will receive individualized written care instructions before being discharged.  A responsible adult must drive you to and from the hospital on the day of surgery and stay with you for 24 hours.  If you are staying overnight following surgery, you will be transported to your hospital room following the recovery period.     Ivermectin Counseling:  Patient instructed to take medication on an empty stomach with a full glass of water.  Patient informed of potential adverse effects including but not limited to nausea, diarrhea, dizziness, itching, and swelling of the extremities or lymph nodes.  The patient verbalized understanding of the proper use and possible adverse effects of ivermectin.  All of the patient's questions and concerns were addressed.

## 2025-07-16 ENCOUNTER — PRE-ADMISSION TESTING (OUTPATIENT)
Dept: PREADMISSION TESTING | Facility: HOSPITAL | Age: 35
End: 2025-07-16
Payer: MEDICAID

## 2025-07-16 DIAGNOSIS — S83.282D TEAR OF LATERAL MENISCUS OF LEFT KNEE, CURRENT, UNSPECIFIED TEAR TYPE, SUBSEQUENT ENCOUNTER: ICD-10-CM

## 2025-07-16 LAB
ANION GAP SERPL CALCULATED.3IONS-SCNC: 11.7 MMOL/L (ref 5–15)
BUN SERPL-MCNC: 14 MG/DL (ref 6–20)
BUN/CREAT SERPL: 21.9 (ref 7–25)
CALCIUM SPEC-SCNC: 8.8 MG/DL (ref 8.6–10.5)
CHLORIDE SERPL-SCNC: 103 MMOL/L (ref 98–107)
CO2 SERPL-SCNC: 22.3 MMOL/L (ref 22–29)
CREAT SERPL-MCNC: 0.64 MG/DL (ref 0.76–1.27)
DEPRECATED RDW RBC AUTO: 39.4 FL (ref 37–54)
EGFRCR SERPLBLD CKD-EPI 2021: 125 ML/MIN/1.73
ERYTHROCYTE [DISTWIDTH] IN BLOOD BY AUTOMATED COUNT: 12.9 % (ref 12.3–15.4)
GLUCOSE SERPL-MCNC: 99 MG/DL (ref 65–99)
HCT VFR BLD AUTO: 44.5 % (ref 37.5–51)
HGB BLD-MCNC: 14.3 G/DL (ref 13–17.7)
MCH RBC QN AUTO: 26.8 PG (ref 26.6–33)
MCHC RBC AUTO-ENTMCNC: 32.1 G/DL (ref 31.5–35.7)
MCV RBC AUTO: 83.5 FL (ref 79–97)
PLATELET # BLD AUTO: 197 10*3/MM3 (ref 140–450)
PMV BLD AUTO: 9.8 FL (ref 6–12)
POTASSIUM SERPL-SCNC: 3.9 MMOL/L (ref 3.5–5.2)
RBC # BLD AUTO: 5.33 10*6/MM3 (ref 4.14–5.8)
SODIUM SERPL-SCNC: 137 MMOL/L (ref 136–145)
WBC NRBC COR # BLD AUTO: 8.34 10*3/MM3 (ref 3.4–10.8)

## 2025-07-16 PROCEDURE — 85027 COMPLETE CBC AUTOMATED: CPT

## 2025-07-16 PROCEDURE — 36415 COLL VENOUS BLD VENIPUNCTURE: CPT

## 2025-07-16 PROCEDURE — 80048 BASIC METABOLIC PNL TOTAL CA: CPT

## 2025-07-17 ENCOUNTER — ANESTHESIA EVENT (OUTPATIENT)
Dept: PERIOP | Facility: HOSPITAL | Age: 35
End: 2025-07-17
Payer: MEDICAID

## 2025-07-17 ENCOUNTER — APPOINTMENT (OUTPATIENT)
Dept: GENERAL RADIOLOGY | Facility: HOSPITAL | Age: 35
End: 2025-07-17
Payer: MEDICAID

## 2025-07-17 ENCOUNTER — ANESTHESIA (OUTPATIENT)
Dept: PERIOP | Facility: HOSPITAL | Age: 35
End: 2025-07-17
Payer: MEDICAID

## 2025-07-17 ENCOUNTER — HOSPITAL ENCOUNTER (OUTPATIENT)
Facility: HOSPITAL | Age: 35
Setting detail: HOSPITAL OUTPATIENT SURGERY
Discharge: HOME | End: 2025-07-17
Attending: ORTHOPAEDIC SURGERY | Admitting: ORTHOPAEDIC SURGERY
Payer: MEDICAID

## 2025-07-17 VITALS
TEMPERATURE: 97.4 F | HEART RATE: 69 BPM | SYSTOLIC BLOOD PRESSURE: 121 MMHG | DIASTOLIC BLOOD PRESSURE: 61 MMHG | RESPIRATION RATE: 18 BRPM | HEIGHT: 66 IN | OXYGEN SATURATION: 95 % | WEIGHT: 267 LBS | BODY MASS INDEX: 42.91 KG/M2

## 2025-07-17 DIAGNOSIS — S83.282D TEAR OF LATERAL MENISCUS OF LEFT KNEE, CURRENT, UNSPECIFIED TEAR TYPE, SUBSEQUENT ENCOUNTER: ICD-10-CM

## 2025-07-17 PROBLEM — S83.282A TEAR OF LATERAL MENISCUS OF LEFT KNEE, CURRENT: Status: RESOLVED | Noted: 2025-07-14 | Resolved: 2025-07-17

## 2025-07-17 PROCEDURE — 25010000002 ONDANSETRON PER 1 MG: Performed by: NURSE ANESTHETIST, CERTIFIED REGISTERED

## 2025-07-17 PROCEDURE — 25010000002 DROPERIDOL PER 5 MG: Performed by: ANESTHESIOLOGY

## 2025-07-17 PROCEDURE — 94761 N-INVAS EAR/PLS OXIMETRY MLT: CPT

## 2025-07-17 PROCEDURE — 94640 AIRWAY INHALATION TREATMENT: CPT

## 2025-07-17 PROCEDURE — 25010000002 FENTANYL CITRATE (PF) 50 MCG/ML SOLUTION: Performed by: NURSE ANESTHETIST, CERTIFIED REGISTERED

## 2025-07-17 PROCEDURE — 25010000002 FAMOTIDINE (PF) 20 MG/2ML SOLUTION: Performed by: NURSE ANESTHETIST, CERTIFIED REGISTERED

## 2025-07-17 PROCEDURE — 25810000003 LACTATED RINGERS PER 1000 ML: Performed by: ANESTHESIOLOGY

## 2025-07-17 PROCEDURE — 94799 UNLISTED PULMONARY SVC/PX: CPT

## 2025-07-17 PROCEDURE — 25010000002 PROPOFOL 200 MG/20ML EMULSION: Performed by: NURSE ANESTHETIST, CERTIFIED REGISTERED

## 2025-07-17 PROCEDURE — 29881 ARTHRS KNE SRG MNISECTMY M/L: CPT | Performed by: ORTHOPAEDIC SURGERY

## 2025-07-17 PROCEDURE — 25010000002 MIDAZOLAM PER 1 MG: Performed by: NURSE ANESTHETIST, CERTIFIED REGISTERED

## 2025-07-17 PROCEDURE — 25010000002 BUPIVACAINE 0.5 % SOLUTION: Performed by: ORTHOPAEDIC SURGERY

## 2025-07-17 PROCEDURE — 25010000002 HYDROMORPHONE PER 4 MG: Performed by: NURSE ANESTHETIST, CERTIFIED REGISTERED

## 2025-07-17 RX ORDER — SODIUM CHLORIDE 0.9 % (FLUSH) 0.9 %
10 SYRINGE (ML) INJECTION EVERY 12 HOURS SCHEDULED
Status: DISCONTINUED | OUTPATIENT
Start: 2025-07-17 | End: 2025-07-17 | Stop reason: HOSPADM

## 2025-07-17 RX ORDER — FENTANYL CITRATE 50 UG/ML
50 INJECTION, SOLUTION INTRAMUSCULAR; INTRAVENOUS
Status: DISCONTINUED | OUTPATIENT
Start: 2025-07-17 | End: 2025-07-17 | Stop reason: HOSPADM

## 2025-07-17 RX ORDER — DROPERIDOL 2.5 MG/ML
0.62 INJECTION, SOLUTION INTRAMUSCULAR; INTRAVENOUS ONCE
Status: COMPLETED | OUTPATIENT
Start: 2025-07-17 | End: 2025-07-17

## 2025-07-17 RX ORDER — MAGNESIUM HYDROXIDE 1200 MG/15ML
LIQUID ORAL AS NEEDED
Status: DISCONTINUED | OUTPATIENT
Start: 2025-07-17 | End: 2025-07-17 | Stop reason: HOSPADM

## 2025-07-17 RX ORDER — MEPERIDINE HYDROCHLORIDE 25 MG/ML
12.5 INJECTION INTRAMUSCULAR; INTRAVENOUS; SUBCUTANEOUS
Status: DISCONTINUED | OUTPATIENT
Start: 2025-07-17 | End: 2025-07-17 | Stop reason: HOSPADM

## 2025-07-17 RX ORDER — IPRATROPIUM BROMIDE AND ALBUTEROL SULFATE 2.5; .5 MG/3ML; MG/3ML
3 SOLUTION RESPIRATORY (INHALATION) ONCE AS NEEDED
Status: COMPLETED | OUTPATIENT
Start: 2025-07-17 | End: 2025-07-17

## 2025-07-17 RX ORDER — HYDROMORPHONE HYDROCHLORIDE 2 MG/ML
INJECTION, SOLUTION INTRAMUSCULAR; INTRAVENOUS; SUBCUTANEOUS AS NEEDED
Status: DISCONTINUED | OUTPATIENT
Start: 2025-07-17 | End: 2025-07-17 | Stop reason: SURG

## 2025-07-17 RX ORDER — OXYCODONE AND ACETAMINOPHEN 5; 325 MG/1; MG/1
1 TABLET ORAL ONCE AS NEEDED
Status: DISCONTINUED | OUTPATIENT
Start: 2025-07-17 | End: 2025-07-17 | Stop reason: HOSPADM

## 2025-07-17 RX ORDER — SODIUM CHLORIDE, SODIUM LACTATE, POTASSIUM CHLORIDE, CALCIUM CHLORIDE 600; 310; 30; 20 MG/100ML; MG/100ML; MG/100ML; MG/100ML
125 INJECTION, SOLUTION INTRAVENOUS ONCE
Status: COMPLETED | OUTPATIENT
Start: 2025-07-17 | End: 2025-07-17

## 2025-07-17 RX ORDER — ONDANSETRON 2 MG/ML
4 INJECTION INTRAMUSCULAR; INTRAVENOUS AS NEEDED
Status: DISCONTINUED | OUTPATIENT
Start: 2025-07-17 | End: 2025-07-17 | Stop reason: HOSPADM

## 2025-07-17 RX ORDER — SODIUM CHLORIDE, SODIUM LACTATE, POTASSIUM CHLORIDE, CALCIUM CHLORIDE 600; 310; 30; 20 MG/100ML; MG/100ML; MG/100ML; MG/100ML
100 INJECTION, SOLUTION INTRAVENOUS ONCE AS NEEDED
Status: DISCONTINUED | OUTPATIENT
Start: 2025-07-17 | End: 2025-07-17 | Stop reason: HOSPADM

## 2025-07-17 RX ORDER — MIDAZOLAM HYDROCHLORIDE 1 MG/ML
INJECTION, SOLUTION INTRAMUSCULAR; INTRAVENOUS AS NEEDED
Status: DISCONTINUED | OUTPATIENT
Start: 2025-07-17 | End: 2025-07-17 | Stop reason: SURG

## 2025-07-17 RX ORDER — MIDAZOLAM HYDROCHLORIDE 1 MG/ML
1 INJECTION, SOLUTION INTRAMUSCULAR; INTRAVENOUS
Status: DISCONTINUED | OUTPATIENT
Start: 2025-07-17 | End: 2025-07-17 | Stop reason: HOSPADM

## 2025-07-17 RX ORDER — ONDANSETRON 2 MG/ML
INJECTION INTRAMUSCULAR; INTRAVENOUS AS NEEDED
Status: DISCONTINUED | OUTPATIENT
Start: 2025-07-17 | End: 2025-07-17 | Stop reason: SURG

## 2025-07-17 RX ORDER — PROPOFOL 10 MG/ML
INJECTION, EMULSION INTRAVENOUS AS NEEDED
Status: DISCONTINUED | OUTPATIENT
Start: 2025-07-17 | End: 2025-07-17 | Stop reason: SURG

## 2025-07-17 RX ORDER — BUPIVACAINE HYDROCHLORIDE 5 MG/ML
INJECTION, SOLUTION PERINEURAL AS NEEDED
Status: DISCONTINUED | OUTPATIENT
Start: 2025-07-17 | End: 2025-07-17 | Stop reason: HOSPADM

## 2025-07-17 RX ORDER — CHLORHEXIDINE GLUCONATE 500 MG/1
CLOTH TOPICAL SEE ADMIN INSTRUCTIONS
Status: DISCONTINUED | OUTPATIENT
Start: 2025-07-17 | End: 2025-07-17 | Stop reason: HOSPADM

## 2025-07-17 RX ORDER — SODIUM CHLORIDE 0.9 % (FLUSH) 0.9 %
10 SYRINGE (ML) INJECTION AS NEEDED
Status: DISCONTINUED | OUTPATIENT
Start: 2025-07-17 | End: 2025-07-17 | Stop reason: HOSPADM

## 2025-07-17 RX ORDER — OXYCODONE AND ACETAMINOPHEN 5; 325 MG/1; MG/1
1 TABLET ORAL EVERY 6 HOURS PRN
Qty: 6 TABLET | Refills: 0 | Status: SHIPPED | OUTPATIENT
Start: 2025-07-17

## 2025-07-17 RX ORDER — FAMOTIDINE 10 MG/ML
INJECTION, SOLUTION INTRAVENOUS AS NEEDED
Status: DISCONTINUED | OUTPATIENT
Start: 2025-07-17 | End: 2025-07-17 | Stop reason: SURG

## 2025-07-17 RX ORDER — FENTANYL CITRATE 50 UG/ML
INJECTION, SOLUTION INTRAMUSCULAR; INTRAVENOUS AS NEEDED
Status: DISCONTINUED | OUTPATIENT
Start: 2025-07-17 | End: 2025-07-17 | Stop reason: SURG

## 2025-07-17 RX ORDER — SODIUM CHLORIDE 9 MG/ML
40 INJECTION, SOLUTION INTRAVENOUS AS NEEDED
Status: DISCONTINUED | OUTPATIENT
Start: 2025-07-17 | End: 2025-07-17 | Stop reason: HOSPADM

## 2025-07-17 RX ADMIN — HYDROMORPHONE HYDROCHLORIDE 0.5 MG: 2 INJECTION, SOLUTION INTRAMUSCULAR; INTRAVENOUS; SUBCUTANEOUS at 11:58

## 2025-07-17 RX ADMIN — FENTANYL CITRATE 100 MCG: 50 INJECTION INTRAMUSCULAR; INTRAVENOUS at 10:52

## 2025-07-17 RX ADMIN — FAMOTIDINE 20 MG: 10 INJECTION, SOLUTION INTRAVENOUS at 10:52

## 2025-07-17 RX ADMIN — IPRATROPIUM BROMIDE AND ALBUTEROL SULFATE 3 ML: .5; 3 SOLUTION RESPIRATORY (INHALATION) at 14:10

## 2025-07-17 RX ADMIN — HYDROMORPHONE HYDROCHLORIDE 1 MG: 2 INJECTION, SOLUTION INTRAMUSCULAR; INTRAVENOUS; SUBCUTANEOUS at 11:28

## 2025-07-17 RX ADMIN — SODIUM CHLORIDE, POTASSIUM CHLORIDE, SODIUM LACTATE AND CALCIUM CHLORIDE: 600; 310; 30; 20 INJECTION, SOLUTION INTRAVENOUS at 10:46

## 2025-07-17 RX ADMIN — PROPOFOL 170 MG: 10 INJECTION, EMULSION INTRAVENOUS at 10:56

## 2025-07-17 RX ADMIN — MIDAZOLAM HYDROCHLORIDE 2 MG: 1 INJECTION, SOLUTION INTRAMUSCULAR; INTRAVENOUS at 10:52

## 2025-07-17 RX ADMIN — HYDROMORPHONE HYDROCHLORIDE 0.5 MG: 2 INJECTION, SOLUTION INTRAMUSCULAR; INTRAVENOUS; SUBCUTANEOUS at 11:47

## 2025-07-17 RX ADMIN — ONDANSETRON 4 MG: 2 INJECTION, SOLUTION INTRAMUSCULAR; INTRAVENOUS at 13:25

## 2025-07-17 RX ADMIN — DROPERIDOL 0.62 MG: 2.5 INJECTION, SOLUTION INTRAMUSCULAR; INTRAVENOUS at 13:42

## 2025-07-17 RX ADMIN — ONDANSETRON 4 MG: 2 INJECTION, SOLUTION INTRAMUSCULAR; INTRAVENOUS at 10:52

## 2025-07-17 NOTE — ANESTHESIA PROCEDURE NOTES
Airway  Reason: elective    Date/Time: 7/17/2025 10:57 AM  End Time:7/17/2025 10:57 AM    General Information and Staff    Patient location during procedure: OR  CRNA/CAA: Marcio Price CRNA    Indications and Patient Condition  Indications for airway management: airway protection    Preoxygenated: yes  MILS maintained throughout    Mask difficulty assessment: 0 - not attempted    Final Airway Details    Final airway type: supraglottic airway      Successful airway: unique  Size: 4  Airway Seal Pressure (cm H2O): 20   Number of attempts at approach: 1  Assessment: lips, teeth, and gum same as pre-op and atraumatic intubation    Additional Comments  Atraumatic

## 2025-07-17 NOTE — ANESTHESIA PREPROCEDURE EVALUATION
Anesthesia Evaluation     Patient summary reviewed and Nursing notes reviewed   no history of anesthetic complications:   NPO Solid Status: > 8 hours  NPO Liquid Status: > 8 hours           Airway   Mallampati: II  TM distance: >3 FB  Neck ROM: full  Dental - normal exam     Pulmonary - negative pulmonary ROS    breath sounds clear to auscultation  Cardiovascular   Exercise tolerance: good (4-7 METS)    Rhythm: regular  Rate: normal    (+) angina      Neuro/Psych  (+) psychiatric history  GI/Hepatic/Renal/Endo - negative ROS     Musculoskeletal (-) negative ROS    Abdominal     Abdomen: soft.   Substance History - negative use     OB/GYN          Other - negative ROS                         Anesthesia Plan    ASA 2     general     intravenous induction     Anesthetic plan, risks, benefits, and alternatives have been provided, discussed and informed consent has been obtained with: patient.    Plan discussed with CRNA.        CODE STATUS:

## 2025-07-17 NOTE — OP NOTE
KNEE ARTHROSCOPY  Procedure Note    oDn Salamanca  7/17/2025    Pre-op Diagnosis:   Tear of lateral meniscus of left knee, current, unspecified tear type, initial encounter [S83.282A]    Post-op Diagnosis:     Post-Op Diagnosis Codes:     * Tear of lateral meniscus of left knee, current, unspecified tear type, initial encounter [S83.282A]           Procedure(s):  RIGHT KNEE ARTHROSCOPY WITH PARTIAL LATERAL MENISCECTOMY    Surgeon(s):  Yang Naranjo MD    Anesthesia: General/local    Operative technique: With patient in the operating theatre under neuro anesthesia with right lower extremity sterilely prepped and draped with tourniquet applied the extremity was exsanguinated tourniquet inflated to 250 mmHg.  With standard arthroscopy portals created the knee inflated with sterile saline arthroscope was introduced into the suprapatellar pouch brought distally patellofemoral joint pristine medial gutter entered medial gutter clean medial compartment revealed intact articular cartilage  , Lateral compartment entered and bucket-handle tear of the lateral meniscus was identified.  The posterior fragment was displaced anteriorly.  Fragment was reduced and the revealed intra substance tearing fragment primarily involve the posterior portion lateral meniscus medial portion of meniscus was torn in an oblique fashion.  The rearmost fragment was removed alternating with punch and patellar shaver leaving the majority of the medial portion lateral meniscus intact.  Debris was evacuated paining meniscus was extensively probed and stable.  1 lesion over the central portion of tibial plateau stable by probing.  Lateral gutter was clean instruments removed water expressed portals injected with 10 cc 0.5 Marcaine closed with 3-0 nylon with sterile dressing applied he was taken to recovery room in stable condition.  Staff:   Circulator: Marjan Sadler RN  Scrub Person: Kym Haley; Emmanuel Yoder  Blood Loss: none    Specimens:   none               Implants/Grafts: none      Drains: None    Complications: none    Tourniquet time: 34  min                      Yang Naranjo MD     Date: 7/17/2025  Time: 11:57 EDT    Cc: Magdalena Garcia APRN    Winlevi Pregnancy And Lactation Text: This medication is considered safe during pregnancy and breastfeeding.

## 2025-07-17 NOTE — ANESTHESIA POSTPROCEDURE EVALUATION
Patient: Don Salamanca    Procedure Summary       Date: 07/17/25 Room / Location: [facility] OR  /  [facility] OR    Anesthesia Start: 1052 Anesthesia Stop: 1202    Procedure: RIGHT KNEE ARTHROSCOPY WITH PARTIAL LATERAL MENISCECTOMY (Right: Knee) Diagnosis:       Tear of lateral meniscus of left knee, current, unspecified tear type, initial encounter      (Tear of lateral meniscus of left knee, current, unspecified tear type, initial encounter [S83.282A])    Surgeons: Yang Naranjo MD Provider: Amado Rosales DO    Anesthesia Type: general ASA Status: 2            Anesthesia Type: general    Vitals  Vitals Value Taken Time   /80 07/17/25 12:35   Temp 97.3 °F (36.3 °C) 07/17/25 12:05   Pulse 72 07/17/25 12:36   Resp 15 07/17/25 12:35   SpO2 95 % 07/17/25 12:35   Vitals shown include unfiled device data.        Post Anesthesia Care and Evaluation    Patient location during evaluation: PACU  Patient participation: complete - patient participated  Level of consciousness: awake  Pain score: 0  Pain management: satisfactory to patient    Airway patency: patent  Anesthetic complications: No anesthetic complications  PONV Status: none  Cardiovascular status: hemodynamically stable  Respiratory status: nasal cannula  Hydration status: acceptable

## 2025-07-23 ENCOUNTER — OFFICE VISIT (OUTPATIENT)
Dept: ORTHOPEDIC SURGERY | Facility: CLINIC | Age: 35
End: 2025-07-23
Payer: MEDICAID

## 2025-07-23 DIAGNOSIS — S83.281A TEAR OF LATERAL MENISCUS OF RIGHT KNEE, CURRENT, UNSPECIFIED TEAR TYPE, INITIAL ENCOUNTER: ICD-10-CM

## 2025-07-23 DIAGNOSIS — Z09 POSTOP CHECK: Primary | ICD-10-CM

## 2025-07-23 DIAGNOSIS — Z98.890 S/P RIGHT KNEE ARTHROSCOPY: Primary | ICD-10-CM

## 2025-07-23 PROCEDURE — 99024 POSTOP FOLLOW-UP VISIT: CPT | Performed by: ORTHOPAEDIC SURGERY

## 2025-07-23 NOTE — PROGRESS NOTES
Postoperative Follow-up          Patient: Don Salamanca  YOB: 1990  Date of Encounter: 07/23/2025      Chief Complaint:   Chief Complaint   Patient presents with    Right Knee - Follow-up            HPI:  Don Salamanca, 35 y.o. male returns in postoperative follow-up arthroscopic partial lateral meniscectomy right knee for chronic bucket-handle tear he is improving ambulating near full weightbearing.        Medical History:  Patient Active Problem List   Diagnosis    Chest pain    Abnormal nuclear stress test    Angina, class III     Past Medical History:   Diagnosis Date    Anxiety     History of chest pain     Right knee pain     Sleep apnea     NO CPAP USE           Surgical History:  Past Surgical History:   Procedure Laterality Date    CARDIAC CATHETERIZATION N/A 07/24/2020    Procedure: Left Heart Cath;  Surgeon: Kevin Petersen MD;  Location: [facility] CATH INVASIVE LOCATION;  Service: Cardiology;  Laterality: N/A;    KNEE ARTHROSCOPY Right 7/17/2025    Procedure: RIGHT KNEE ARTHROSCOPY WITH PARTIAL LATERAL MENISCECTOMY;  Surgeon: Yang Naranjo MD;  Location: [facility] OR;  Service: Orthopedics;  Laterality: Right;    KNEE ARTHROSCOPY W/ MENISCECTOMY Left 03/07/2022    Procedure: LEFT KNEE ARTHROSCOPIC  MENISCAL REPAIR;  Surgeon: Markel Greco MD;  Location: [facility] OR;  Service: Orthopedics;  Laterality: Left;    LAPAROSCOPIC CHOLECYSTECTOMY      WISDOM TOOTH EXTRACTION         Examination:  Examination right knee mild effusion arthroscopy portals intact without surrounding erythema.  Normal neurovascular status.        Assessment & Plan:  35 y.o. male presents follow-up arthroscopic partial lateral meniscectomy right knee doing well 6 days postop today his sutures are removed he is referred to physical therapy per protocol with scheduled follow-up in 6 weeks.       Diagnosis Plan   1. Postop check                  Cc:  Magdalena Garcia APRN              This document has been  electronically signed by Yang Naranjo MD   July 31, 2025 14:43 EDT

## 2025-07-25 ENCOUNTER — HOSPITAL ENCOUNTER (OUTPATIENT)
Dept: PHYSICAL THERAPY | Facility: HOSPITAL | Age: 35
Setting detail: THERAPIES SERIES
Discharge: HOME | End: 2025-07-25
Payer: MEDICAID

## 2025-07-25 DIAGNOSIS — Z98.890 S/P ARTHROSCOPIC PARTIAL LATERAL MENISCECTOMY OF RIGHT KNEE: ICD-10-CM

## 2025-07-25 DIAGNOSIS — M25.661 DECREASED RANGE OF MOTION (ROM) OF RIGHT KNEE: ICD-10-CM

## 2025-07-25 DIAGNOSIS — S83.281A TEAR OF LATERAL MENISCUS OF RIGHT KNEE, CURRENT, UNSPECIFIED TEAR TYPE, INITIAL ENCOUNTER: ICD-10-CM

## 2025-07-25 DIAGNOSIS — Z87.828 S/P ARTHROSCOPIC PARTIAL LATERAL MENISCECTOMY OF RIGHT KNEE: ICD-10-CM

## 2025-07-25 DIAGNOSIS — Z98.890 S/P RIGHT KNEE ARTHROSCOPY: Primary | ICD-10-CM

## 2025-07-25 PROCEDURE — 97162 PT EVAL MOD COMPLEX 30 MIN: CPT | Performed by: PHYSICAL THERAPIST

## 2025-07-25 NOTE — THERAPY EVALUATION
Physical Therapy Initial Evaluation and Plan of Care    Patient: Don Salamanca   : 1990  Diagnosis/ICD-10 Code:  [unfilled]  Referring practitioner: Yang Naranjo, *  Date of Initial Visit: 2025  Today's Date: 2025  Patient seen for 1 session         Visit Diagnoses:    ICD-10-CM ICD-9-CM   1. S/P right knee arthroscopy  Z98.890 V45.89   2. Tear of lateral meniscus of right knee, current, unspecified tear type, initial encounter  S83.281A 836.1   3. S/P arthroscopic partial lateral meniscectomy of right knee  Z98.890 V45.89    Z87.828 V15.59   4. Decreased range of motion (ROM) of right knee  M25.661 719.56         Subjective Questionnaire: LEFS: 47/80      Subjective Evaluation    History of Present Illness  Date of surgery: 2025  Mechanism of injury: Patient underwent a right arthroscopy with partial lateral meniscectomy on 2025.  Patient reports that he is having knee pain, but denies swelling since the surgery.  Patient reports that he has noticed no significant ROM deficits, but feels like the right LE is weaker.  He mentions difficulty with stair climbing and squatting.      Patient Occupation: Unemployed Pain  Current pain rating: 3  At best pain ratin  At worst pain ratin  Location: R) knee  Quality: knife-like, sharp, dull ache and discomfort  Relieving factors: rest and change in position  Aggravating factors: stairs, squatting, standing and ambulation    Patient Goals  Patient goals for therapy: increased strength and decreased pain           Objective          Tenderness     Right Knee   Tenderness in the inferior patella, lateral joint line, lateral patella, medial joint line, medial patella and superior patella.     Active Range of Motion     Right Knee   Flexion: 96 degrees   Extension: Right knee active extension: lacking 14 degrees from full extension.     Strength/Myotome Testing     Left Hip   Planes of Motion   Flexion: 4+  Abduction: 4+  Adduction:  4+    Right Hip   Planes of Motion   Flexion: 4  Abduction: 4  Adduction: 4-    Left Knee   Flexion: 5  Extension: 5    Right Knee   Flexion: 4-  Extension: 4-    Swelling     Left Knee Girth Measurement (cm)   Joint line: 44 cm    Right Knee Girth Measurement (cm)   Joint line: 44 cm    Ambulation   Weight-Bearing Status   Weight-Bearing Status (Right): weight-bearing as tolerated    Assistive device used: none    Observational Gait   Gait: antalgic   Decreased right stance time and right step length.   Right foot contact pattern: foot flat          Assessment & Plan       Assessment  Impairments: abnormal gait, abnormal muscle firing, abnormal or restricted ROM, activity intolerance, impaired balance, impaired physical strength, lacks appropriate home exercise program, pain with function and weight-bearing intolerance   Functional limitations: sleeping, walking, uncomfortable because of pain, moving in bed, standing, stooping and unable to perform repetitive tasks   Assessment details: Patient is a 35 year old male who comes to physical therapy for rehabilitation s/p right knee partial lateral meniscectomy. The patient presents with increased pain, decreased right knee ROM, decreased right LE strength, and impaired gait. Patient reports a 41.25% functional mobility impairment, based on the patient's response to the LEFS.  Patient will benefit from skilled PT, so that patient can achieve maximum level of function.     Prognosis: good    Goals  Plan Goals: SHORT TERM GOALS:  4 weeks       1. Patient will be independent/complaint with HEP.  2. Patient will report pain no greater than 3/10 when performing self-care activities.  3. Pt to report being able to stand for 15 minutes with pain no greater than 3/10 in the right knee.  4. Patient will demonstrate 5-0-110 degrees right knee ROM to allow for greater ease with ADLs.    LONG TERM GOALS:   8 weeks  1. Patient will report at least 60/80 on LEFS for improved  independence.  2. Patient to demonstrate right LE strength to 4+/5 or greater to improve safety with ambulation on uneven surfaces.  3. Patient to report being able to walk for 30 minutes without increasing pain in the right knee to allow for improved community involvement.  4. Patient will be able to ascend/descend 10 steps with reciprocal pattern and no increase in pain to allow for improved community participation.  5. Patient will demonstrate 0-125 degrees right knee ROM for improved gait.  6. Patient will be able to perform 10 mini squats with no increase in pain to allow for improved ease with household chores.    Plan  Therapy options: will be seen for skilled therapy services  Planned modality interventions: cryotherapy, electrical stimulation/Russian stimulation, TENS, thermotherapy (hydrocollator packs) and ultrasound  Planned therapy interventions: manual therapy, ADL retraining, balance/weight-bearing training, neuromuscular re-education, body mechanics training, postural training, soft tissue mobilization, flexibility, functional ROM exercises, strengthening, gait training, stretching, home exercise program, therapeutic activities, IADL retraining, joint mobilization and transfer training  Frequency: 2x week  Duration in weeks: 8  Treatment plan discussed with: patient  Plan details: Moderate Evaluation  88592  Re-evaluation   13820    Therapeutic exercise  70706  Therapeutic activity    32999  Neuromuscular re-education   90134  Manual therapy   92090  Gait training  06386    Attended e-stim  40061  Unattended e-stim (Medicaid/Medicare)     Moist heat/cryotherapy 48450   Ultrasound   22532        History # of Personal Factors and/or Comorbidities: MODERATE (1-2)  Examination of Body System(s): # of elements: MODERATE (3)  Clinical Presentation: STABLE   Clinical Decision Making: MODERATE      Timed:         Manual Therapy:         mins  55097;     Therapeutic Exercise:         mins  47901;      Neuromuscular Adrian:        mins  56855;    Therapeutic Activity:          mins  43630;     Gait Training:           mins  76109;     Ultrasound:          mins  45183;    Ionto                                   mins   35047  Self Care                            mins   46110      Un-Timed:  Electrical Stimulation:         mins  40906 ( );  Dry Needling          mins self-pay  Traction          mins 82803  Low Eval          Mins 78771  Mod Eval     55     Mins 33132  High Eval                            Mins 71562  Re-Eval          Mins 71211  Canalith Repos         mins 48335      Timed Treatment:      mins   Total Treatment:     55   mins          PT: Fina Luciano PT     License Number: [id number]  Electronically signed by Fina Luciano PT, 07/25/25, 11:03 AM EDT    Certification Period: 7/25/2025 thru 10/22/2025  I certify that the therapy services are furnished while this patient is under my care.  The services outlined above are required by this patient, and will be reviewed every 90 days.         Physician Signature:__________________________________________________    PHYSICIAN: Yang Naranjo MD  NPI: [id number]                                      DATE:      Please sign and return via fax to [email or web address] . Thank you, [facility].

## 2025-07-28 ENCOUNTER — APPOINTMENT (OUTPATIENT)
Dept: PHYSICAL THERAPY | Facility: HOSPITAL | Age: 35
End: 2025-07-28
Payer: MEDICAID

## (undated) DEVICE — MAT FLR ABSORBENT LG 4FT 10 2.5FT

## (undated) DEVICE — BLD CUT FORMLA AGGR PLS 4.0MM

## (undated) DEVICE — SKIN PREP TRAY W/CHG: Brand: MEDLINE INDUSTRIES, INC.

## (undated) DEVICE — BNDG,ELSTC,MATRIX,STRL,6"X5YD,LF,HOOK&LP: Brand: MEDLINE

## (undated) DEVICE — SPNG GZ WOVN 4X4IN 12PLY 10/BX STRL

## (undated) DEVICE — GLV SURG TRIUMPH MICRO PF LTX 8 STRL

## (undated) DEVICE — CANNULA,OXY,ADULT,SUPER SOFT,W/14'TUB,UC: Brand: MEDLINE INDUSTRIES, INC.

## (undated) DEVICE — GOWN,NON-REINFORCED,SIRUS,SET IN SLV,XXL: Brand: MEDLINE

## (undated) DEVICE — APPL CHLORAPREP HI/LITE 26ML ORNG

## (undated) DEVICE — 4-PORT MANIFOLD: Brand: NEPTUNE 2

## (undated) DEVICE — ST INF PRI SMRTSTE 20DRP 2VLV 24ML 117

## (undated) DEVICE — BNDG ELAS ELITE V/CLOSE 6IN 5YD LF STRL

## (undated) DEVICE — CATH F5 INF JR 4 100CM: Brand: INFINITI

## (undated) DEVICE — NDL,TUOHY EPID,17GX3.5",PLASTIC STYLET: Brand: MEDLINE

## (undated) DEVICE — TBG PUMP ARTHSCP MAIN AR6400 16FT

## (undated) DEVICE — BNDG ELAS CO-FLEX SLF ADHR 4IN5YD LF STRL

## (undated) DEVICE — SYR LUERLOK 30CC

## (undated) DEVICE — DRAPE, RADIAL, STERILE: Brand: MEDLINE

## (undated) DEVICE — RADIFOCUS OPTITORQUE ANGIOGRAPHIC CATHETER: Brand: OPTITORQUE

## (undated) DEVICE — DRSNG PAD ABD 8X10IN STRL

## (undated) DEVICE — ELECTRD DEFIB M/FUNC STATPADZ PK/2

## (undated) DEVICE — GLIDESHEATH SLENDER STAINLESS STEEL KIT: Brand: GLIDESHEATH SLENDER

## (undated) DEVICE — PAD LEG HLDR

## (undated) DEVICE — PK KN ARTHSCP 70

## (undated) DEVICE — DRSNG WND GZ CURAD OIL EMULSION 3X8IN LF STRL 1PK

## (undated) DEVICE — SUT ETHLN 3-0 FS118IN 663H

## (undated) DEVICE — HOLDER: Brand: DEROYAL

## (undated) DEVICE — PAD WRAP/ON KN COOL/THERP W/ELAS/STRAP LF

## (undated) DEVICE — Device: Brand: MEDEX

## (undated) DEVICE — GW INQW FIX/CORE PTFE J/3MM .035 260CM

## (undated) DEVICE — WRP COMPR KN COLD UNIV

## (undated) DEVICE — PUSHER KNOT SUTR/CUT W/PORTAL SKID

## (undated) DEVICE — PATIENT RETURN ELECTRODE, SINGLE-USE, CONTACT QUALITY MONITORING, ADULT, WITH 9FT CORD, FOR PATIENTS WEIGING OVER 33LBS. (15KG): Brand: MEGADYNE

## (undated) DEVICE — RUNWAY RADL W/TOP PAD

## (undated) DEVICE — HYPODERMIC SAFETY NEEDLE: Brand: MONOJECT

## (undated) DEVICE — DRSNG SURESITE WNDW 4X4.5

## (undated) DEVICE — PK CATH CARD 70

## (undated) DEVICE — TR BAND RADIAL ARTERY COMPRESSION DEVICE: Brand: TR BAND

## (undated) DEVICE — Device

## (undated) DEVICE — SYS COLD/THRP POLAR/CARE/CUBE

## (undated) DEVICE — GLV SURG SENSICARE W/ALOE PF LF 8.5 STRL

## (undated) DEVICE — CUFF TOURNI 1BLADDER 1PRT 34IN STRL

## (undated) DEVICE — BNDG ELAS MATRX V/CLS 6IN 5YD LF

## (undated) DEVICE — IMMOB KN 3PNL DLX CANVS 19IN BLU

## (undated) DEVICE — PAD REPL POST SURG TOTL KN

## (undated) DEVICE — SUT ETHLN 3/0 PS2 18 IN 1669H

## (undated) DEVICE — ST EXT IV SMARTSITE 2VLV SP M LL 5ML IV1